# Patient Record
Sex: FEMALE | Race: BLACK OR AFRICAN AMERICAN | Employment: FULL TIME | URBAN - METROPOLITAN AREA
[De-identification: names, ages, dates, MRNs, and addresses within clinical notes are randomized per-mention and may not be internally consistent; named-entity substitution may affect disease eponyms.]

---

## 2019-08-19 PROBLEM — O99.211 OBESITY AFFECTING PREGNANCY IN FIRST TRIMESTER: Status: ACTIVE | Noted: 2019-08-19

## 2019-08-19 PROBLEM — O15.9 ECLAMPSIA: Status: ACTIVE | Noted: 2019-08-19

## 2019-08-19 PROBLEM — Z87.59 HISTORY OF PRETERM PREMATURE RUPTURE OF MEMBRANES (PPROM): Status: ACTIVE | Noted: 2019-08-19

## 2019-08-19 LAB
CHLAMYDIA, EXTERNAL: NEGATIVE
HBSAG, EXTERNAL: NEGATIVE
HIV, EXTERNAL: NEGATIVE
N. GONORRHEA, EXTERNAL: NEGATIVE
RPR, EXTERNAL: NORMAL
RUBELLA, EXTERNAL: NORMAL
TYPE, ABO & RH, EXTERNAL: NORMAL

## 2019-09-20 PROBLEM — R73.09 ABNORMAL GLUCOSE TOLERANCE TEST: Status: ACTIVE | Noted: 2019-09-20

## 2020-02-17 ENCOUNTER — HOSPITAL ENCOUNTER (OUTPATIENT)
Age: 30
Discharge: HOME | End: 2020-02-17
Attending: ANESTHESIOLOGY | Admitting: ANESTHESIOLOGY

## 2020-02-17 VITALS — BODY MASS INDEX: 48.03 KG/M2 | HEIGHT: 62 IN | WEIGHT: 261 LBS

## 2020-02-17 NOTE — CONSULTS
Patient was seen today for pre labor evaluation. She has not history of anesthesia complications. This is her third pregnancy. Airway 2 today/BMI 48. No contraindications for delivery at [facility].

## 2020-02-18 PROBLEM — Z98.890 HISTORY OF BILATERAL BREAST REDUCTION SURGERY: Chronic | Status: ACTIVE | Noted: 2020-02-18

## 2020-02-28 LAB — GRBS, EXTERNAL: NEGATIVE

## 2020-03-11 ENCOUNTER — HOSPITAL ENCOUNTER (OUTPATIENT)
Age: 30
Discharge: HOME | End: 2020-03-11
Attending: OBSTETRICS & GYNECOLOGY | Admitting: OBSTETRICS & GYNECOLOGY
Payer: COMMERCIAL

## 2020-03-11 VITALS
HEART RATE: 101 BPM | WEIGHT: 263 LBS | BODY MASS INDEX: 49.65 KG/M2 | SYSTOLIC BLOOD PRESSURE: 139 MMHG | DIASTOLIC BLOOD PRESSURE: 64 MMHG | HEIGHT: 61 IN

## 2020-03-11 PROBLEM — R10.9 ABDOMINAL CRAMPING AFFECTING PREGNANCY: Status: ACTIVE | Noted: 2020-03-11

## 2020-03-11 PROBLEM — O26.899 ABDOMINAL CRAMPING AFFECTING PREGNANCY: Status: ACTIVE | Noted: 2020-03-11

## 2020-03-11 LAB
APPEARANCE UR: ABNORMAL
BILIRUB UR QL: NEGATIVE
COLOR UR: YELLOW
GLUCOSE UR QL STRIP.AUTO: NEGATIVE MG/DL
KETONES UR-MCNC: NEGATIVE MG/DL
LEUKOCYTE ESTERASE UR QL STRIP: ABNORMAL
NITRITE UR QL: NEGATIVE
PH UR: 6.5 [PH] (ref 5–9)
PROT UR QL: 30 MG/DL
RBC # UR STRIP: ABNORMAL /UL
SERVICE CMNT-IMP: ABNORMAL
SP GR UR: 1.02 (ref 1–1.02)
UROBILINOGEN UR QL: 0.2 EU/DL (ref 0.2–1)

## 2020-03-11 PROCEDURE — 59025 FETAL NON-STRESS TEST: CPT

## 2020-03-11 PROCEDURE — 59020 FETAL CONTRACT STRESS TEST: CPT

## 2020-03-11 PROCEDURE — 81003 URINALYSIS AUTO W/O SCOPE: CPT

## 2020-03-11 PROCEDURE — 99282 EMERGENCY DEPT VISIT SF MDM: CPT

## 2020-03-11 PROCEDURE — 96360 HYDRATION IV INFUSION INIT: CPT

## 2020-03-11 PROCEDURE — 74011250636 HC RX REV CODE- 250/636: Performed by: ADVANCED PRACTICE MIDWIFE

## 2020-03-11 RX ADMIN — SODIUM CHLORIDE, SODIUM LACTATE, POTASSIUM CHLORIDE, AND CALCIUM CHLORIDE 1000 ML: 600; 310; 30; 20 INJECTION, SOLUTION INTRAVENOUS at 19:07

## 2020-03-11 NOTE — PROGRESS NOTES
1315  37+6 weeks gestation with c/o pressure and cramping x2 days. Requested pt to leave urine sample, unable at this time. 1340 SVE 1.5/50/-3 by this nurse    1404 reactive NST noted and reviewed with DEMETRICE Aldridge RN. Pt taken off FHM and encouraged to ambulate or sit on birthing ball. 1410 Pt able to leave urine sample at this time. [id number] 3/50/-3 by this nurse. Informed MONSTER Hendricks CNM, order to recheck in 2 hours. 200 3-4/50/-3 by this nurse. 1758 pt placed back on FHM. 1910 Bedside and Verbal shift change report given to DEMETRICE Ko RN (oncoming nurse) by Jimmy Jay RN   (offgoing nurse). Report included the following information SBAR, Kardex, Intake/Output, MAR, Recent Results and Med Rec Status.

## 2020-03-12 NOTE — PROGRESS NOTES
1910:  Assumed care of pt from [place], [place].  Pt in here for r/o with pain rating 3-10. Spoke with Randy Gonsalez CNM  Order for pt to finish fluids then she may go home. 2030:  SVE 4/80/-2 2038: Pt educated on pregnancy precautions. Pt expressed understanding. Pt ambulated off unit with family.

## 2020-03-12 NOTE — DISCHARGE INSTRUCTIONS
Patient Education   Patient Education        Week 37 of Your Pregnancy: Care Instructions  Your Care Instructions    You are near the end of your pregnancy--and you're probably pretty uncomfortable. It may be harder to walk around. Lying down probably isn't comfortable either. You may have trouble getting to sleep or staying asleep. Most women deliver their babies between 40 and 41 weeks. This is a good time to think about packing a bag for the hospital with items you'll need. Then you'll be ready when labor starts. Follow-up care is a key part of your treatment and safety. Be sure to make and go to all appointments, and call your doctor if you are having problems. It's also a good idea to know your test results and keep a list of the medicines you take. How can you care for yourself at home? Learn about breastfeeding  · Breastfeeding is best for your baby and good for you. · Breast milk has antibodies to help your baby fight infections. · Mothers who breastfeed often lose weight faster, because making milk burns calories. · Learning the best ways to hold your baby will make breastfeeding easier. · Let your partner bathe and diaper the baby to keep your partner from feeling left out. Snuggle together when you breastfeed. · You may want to learn how to use a breast pump and store your milk. · If you choose to bottle feed, make the feeding feel like breastfeeding so you can bond with your baby. Always hold your baby and the bottle. Do not prop bottles or let your baby fall asleep with a bottle. Learn about crying  · It is common for babies to cry for 1 to 3 hours a day. Some cry more, some cry less. · Babies don't cry to make you upset or because you are a bad parent. · Crying is how your baby communicates. Your baby may be hungry; have gas; need a diaper change; or feel cold, warm, tired, lonely, or tense. Sometimes babies cry for unknown reasons.   · If you respond to your baby's needs, he or she will learn to trust you. · Try to stay calm when your baby cries. Your baby may get more upset if he or she senses that you are upset. Know how to care for your   · Your baby's umbilical cord stump will drop off on its own, usually between 1 and 2 weeks. To care for your baby's umbilical cord area:  ? Clean the area at the bottom of the cord 2 or 3 times a day. ? Pay special attention to the area where the cord attaches to the skin. ? Keep the diaper folded below the cord. ? Use a damp washcloth or cotton ball to sponge bathe your baby until the stump has come off. · Your baby's first dark stool is called meconium. After the meconium is passed, your baby will develop his or her own bowel pattern. ? Some babies, especially  babies, have several bowel movements a day. Others have one or two a day, or one every 2 to 3 days. ?  babies often have loose, yellow stools. Formula-fed babies have more formed stools. ? If your baby's stools look like little pellets, he or she is constipated. After 2 days of constipation, call your baby's doctor. · If your baby will be circumcised, you can care for him at home. ? Gently rinse his penis with warm water after every diaper change. Do not try to remove the film that forms on the penis. This film will go away on its own. Pat dry. ? Put petroleum ointment, such as Vaseline, on the area of the diaper that will touch your baby's penis. This will keep the diaper from sticking to your baby. ? Ask the doctor about giving your baby acetaminophen (Tylenol) for pain. Where can you learn more? Go to http://sebas-florentin.info/. Enter 68 21 [id number] in the search box to learn more about \"Week 37 of Your Pregnancy: Care Instructions. \"  Current as of: May 29, 2019  Content Version: 12.2  © 6107-3396 [employer]. Care instructions adapted under license by [employer] (which disclaims liability or warranty for this information).  If you have questions about a medical condition or this instruction, always ask your healthcare professional. [address] any warranty or liability for your use of this information. Pregnancy Precautions: Care Instructions  Your Care Instructions    There is no sure way to prevent labor before your due date ( labor) or to prevent most other pregnancy problems. But there are things you can do to increase your chances of a healthy pregnancy. Go to your appointments, follow your doctor's advice, and take good care of yourself. Eat well, and exercise (if your doctor agrees). And make sure to drink plenty of water. Follow-up care is a key part of your treatment and safety. Be sure to make and go to all appointments, and call your doctor if you are having problems. It's also a good idea to know your test results and keep a list of the medicines you take. How can you care for yourself at home? · Make sure you go to your prenatal appointments. At each visit, your doctor will check your blood pressure. Your doctor will also check to see if you have protein in your urine. High blood pressure and protein in urine are signs of preeclampsia. This condition can be dangerous for you and your baby. · Drink plenty of fluids, enough so that your urine is light yellow or clear like water. Dehydration can cause contractions. If you have kidney, heart, or liver disease and have to limit fluids, talk with your doctor before you increase the amount of fluids you drink. · Tell your doctor right away if you notice any symptoms of an infection, such as:  ? Burning when you urinate. ? A foul-smelling discharge from your vagina. ? Vaginal itching. ? Unexplained fever. ? Unusual pain or soreness in your uterus or lower belly. · Eat a balanced diet. Include plenty of foods that are high in calcium and iron. ? Foods high in calcium include milk, cheese, yogurt, almonds, and broccoli. ?  Foods high in iron include red meat, shellfish, poultry, eggs, beans, raisins, whole-grain bread, and leafy green vegetables. · Do not smoke. If you need help quitting, talk to your doctor about stop-smoking programs and medicines. These can increase your chances of quitting for good. · Do not drink alcohol or use illegal drugs. · Follow your doctor's directions about activity. Your doctor will let you know how much, if any, exercise you can do. · Ask your doctor if you can have sex. If you are at risk for early labor, your doctor may ask you to not have sex. · Take care to prevent falls. During pregnancy, your joints are loose, and your balance is off. Sports such as bicycling, skiing, or in-line skating can increase your risk of falling. And don't ride horses or motorcycles, dive, water ski, scuba dive, or parachute jump while you are pregnant. · Avoid getting very hot. Do not use saunas or hot tubs. Avoid staying out in the sun in hot weather for long periods. Take acetaminophen (Tylenol) to lower a high fever. · Do not take any over-the-counter or herbal medicines or supplements without talking to your doctor or pharmacist first.  When should you call for help? Call 911 anytime you think you may need emergency care. For example, call if:    · You passed out (lost consciousness).     · You have a seizure.     · You have severe vaginal bleeding.     · You have severe pain in your belly or pelvis.     · You have had fluid gushing or leaking from your vagina and you know or think the umbilical cord is bulging into your vagina. If this happens, immediately get down on your knees so your rear end (buttocks) is higher than your head. This will decrease the pressure on the cord until help arrives.   [facility] your doctor now or seek immediate medical care if:    · You have signs of preeclampsia, such as:  ? Sudden swelling of your face, hands, or feet. ? New vision problems (such as dimness, blurring, or seeing spots). ?  A severe headache.     · You have any vaginal bleeding.     · You have belly pain or cramping.     · You have a fever.     · You have had regular contractions (with or without pain) for an hour. This means that you have 8 or more within 1 hour or 4 or more in 20 minutes after you change your position and drink fluids.     · You have a sudden release of fluid from your vagina.     · You have low back pain or pelvic pressure that does not go away.     · You notice that your baby has stopped moving or is moving much less than normal.    Watch closely for changes in your health, and be sure to contact your doctor if you have any problems. Where can you learn more? Go to http://sebas-florentin.info/. Enter [id number] in the search box to learn more about \"Pregnancy Precautions: Care Instructions. \"  Current as of: May 29, 2019  Content Version: 12.2  © 8187-9548 [employer]. Care instructions adapted under license by [employer] (which disclaims liability or warranty for this information). If you have questions about a medical condition or this instruction, always ask your healthcare professional. [address] any warranty or liability for your use of this information.

## 2020-03-14 ENCOUNTER — ANESTHESIA EVENT (OUTPATIENT)
Dept: LABOR AND DELIVERY | Age: 30
End: 2020-03-14
Payer: COMMERCIAL

## 2020-03-14 ENCOUNTER — HOSPITAL ENCOUNTER (INPATIENT)
Age: 30
LOS: 2 days | Discharge: HOME | End: 2020-03-16
Attending: OBSTETRICS & GYNECOLOGY | Admitting: OBSTETRICS & GYNECOLOGY
Payer: COMMERCIAL

## 2020-03-14 ENCOUNTER — ANESTHESIA (OUTPATIENT)
Dept: LABOR AND DELIVERY | Age: 30
End: 2020-03-14
Payer: COMMERCIAL

## 2020-03-14 PROBLEM — Z3A.38 38 WEEKS GESTATION OF PREGNANCY: Status: ACTIVE | Noted: 2020-03-14

## 2020-03-14 PROBLEM — Z33.1 IUP (INTRAUTERINE PREGNANCY), INCIDENTAL: Status: ACTIVE | Noted: 2020-03-14

## 2020-03-14 PROBLEM — O47.9 UTERINE CONTRACTIONS DURING PREGNANCY: Status: ACTIVE | Noted: 2020-03-14

## 2020-03-14 LAB
ABO + RH BLD: NORMAL
ALBUMIN SERPL-MCNC: 3.1 G/DL (ref 3.4–5)
ALBUMIN/GLOB SERPL: 0.8 {RATIO} (ref 0.8–1.7)
ALP SERPL-CCNC: 149 U/L (ref 45–117)
ALT SERPL-CCNC: 15 U/L (ref 13–56)
ANION GAP SERPL CALC-SCNC: 7 MMOL/L (ref 3–18)
AST SERPL-CCNC: 15 U/L (ref 10–38)
BASOPHILS # BLD: 0 K/UL (ref 0–0.1)
BASOPHILS NFR BLD: 0 % (ref 0–2)
BILIRUB SERPL-MCNC: 0.5 MG/DL (ref 0.2–1)
BLOOD GROUP ANTIBODIES SERPL: NORMAL
BUN SERPL-MCNC: 3 MG/DL (ref 7–18)
BUN/CREAT SERPL: 5 (ref 12–20)
CALCIUM SERPL-MCNC: 8.8 MG/DL (ref 8.5–10.1)
CHLORIDE SERPL-SCNC: 106 MMOL/L (ref 100–111)
CO2 SERPL-SCNC: 23 MMOL/L (ref 21–32)
CREAT SERPL-MCNC: 0.64 MG/DL (ref 0.6–1.3)
DIFFERENTIAL METHOD BLD: ABNORMAL
EOSINOPHIL # BLD: 0.1 K/UL (ref 0–0.4)
EOSINOPHIL NFR BLD: 1 % (ref 0–5)
ERYTHROCYTE [DISTWIDTH] IN BLOOD BY AUTOMATED COUNT: 13.8 % (ref 11.6–14.5)
GLOBULIN SER CALC-MCNC: 4 G/DL (ref 2–4)
GLUCOSE SERPL-MCNC: 99 MG/DL (ref 74–99)
HCT VFR BLD AUTO: 38.2 % (ref 35–45)
HGB BLD-MCNC: 12.1 G/DL (ref 12–16)
LYMPHOCYTES # BLD: 2.4 K/UL (ref 0.9–3.6)
LYMPHOCYTES NFR BLD: 22 % (ref 21–52)
MCH RBC QN AUTO: 29.8 PG (ref 24–34)
MCHC RBC AUTO-ENTMCNC: 31.7 G/DL (ref 31–37)
MCV RBC AUTO: 94.1 FL (ref 74–97)
MONOCYTES # BLD: 1.1 K/UL (ref 0.05–1.2)
MONOCYTES NFR BLD: 10 % (ref 3–10)
NEUTS SEG # BLD: 7.3 K/UL (ref 1.8–8)
NEUTS SEG NFR BLD: 67 % (ref 40–73)
PLATELET # BLD AUTO: 181 K/UL (ref 135–420)
PMV BLD AUTO: 13.4 FL (ref 9.2–11.8)
POTASSIUM SERPL-SCNC: 3.6 MMOL/L (ref 3.5–5.5)
PROT SERPL-MCNC: 7.1 G/DL (ref 6.4–8.2)
RBC # BLD AUTO: 4.06 M/UL (ref 4.2–5.3)
SODIUM SERPL-SCNC: 136 MMOL/L (ref 136–145)
SPECIMEN EXP DATE BLD: NORMAL
URATE SERPL-MCNC: 5.8 MG/DL (ref 2.6–7.2)
WBC # BLD AUTO: 10.9 K/UL (ref 4.6–13.2)

## 2020-03-14 PROCEDURE — 74011250636 HC RX REV CODE- 250/636: Performed by: SPECIALIST

## 2020-03-14 PROCEDURE — 75410000000 HC DELIVERY VAGINAL/SINGLE

## 2020-03-14 PROCEDURE — 75410000002 HC LABOR FEE PER 1 HR

## 2020-03-14 PROCEDURE — 75410000003 HC RECOV DEL/VAG/CSECN EA 0.5 HR

## 2020-03-14 PROCEDURE — 77030007879 HC KT SPN EPDRL TELE -B: Performed by: SPECIALIST

## 2020-03-14 PROCEDURE — 80053 COMPREHEN METABOLIC PANEL: CPT

## 2020-03-14 PROCEDURE — 83615 LACTATE (LD) (LDH) ENZYME: CPT

## 2020-03-14 PROCEDURE — 76060000078 HC EPIDURAL ANESTHESIA

## 2020-03-14 PROCEDURE — 74011250636 HC RX REV CODE- 250/636: Performed by: ADVANCED PRACTICE MIDWIFE

## 2020-03-14 PROCEDURE — 86900 BLOOD TYPING SEROLOGIC ABO: CPT

## 2020-03-14 PROCEDURE — 65270000029 HC RM PRIVATE

## 2020-03-14 PROCEDURE — 74011250636 HC RX REV CODE- 250/636

## 2020-03-14 PROCEDURE — 84550 ASSAY OF BLOOD/URIC ACID: CPT

## 2020-03-14 PROCEDURE — 74011250637 HC RX REV CODE- 250/637: Performed by: ADVANCED PRACTICE MIDWIFE

## 2020-03-14 PROCEDURE — 85025 COMPLETE CBC W/AUTO DIFF WBC: CPT

## 2020-03-14 PROCEDURE — 99281 EMR DPT VST MAYX REQ PHY/QHP: CPT

## 2020-03-14 RX ORDER — ONDANSETRON 2 MG/ML
4 INJECTION INTRAMUSCULAR; INTRAVENOUS
Status: DISCONTINUED | OUTPATIENT
Start: 2020-03-14 | End: 2020-03-14 | Stop reason: HOSPADM

## 2020-03-14 RX ORDER — ZOLPIDEM TARTRATE 5 MG/1
5 TABLET ORAL
Status: DISCONTINUED | OUTPATIENT
Start: 2020-03-14 | End: 2020-03-16 | Stop reason: HOSPADM

## 2020-03-14 RX ORDER — METHYLERGONOVINE MALEATE 0.2 MG/ML
0.2 INJECTION INTRAVENOUS AS NEEDED
Status: DISCONTINUED | OUTPATIENT
Start: 2020-03-14 | End: 2020-03-14 | Stop reason: HOSPADM

## 2020-03-14 RX ORDER — TERBUTALINE SULFATE 1 MG/ML
0.25 INJECTION SUBCUTANEOUS
Status: DISCONTINUED | OUTPATIENT
Start: 2020-03-14 | End: 2020-03-14 | Stop reason: HOSPADM

## 2020-03-14 RX ORDER — NALBUPHINE HYDROCHLORIDE 10 MG/ML
10 INJECTION, SOLUTION INTRAMUSCULAR; INTRAVENOUS; SUBCUTANEOUS
Status: DISCONTINUED | OUTPATIENT
Start: 2020-03-14 | End: 2020-03-14 | Stop reason: HOSPADM

## 2020-03-14 RX ORDER — FENTANYL CITRATE 50 UG/ML
100 INJECTION, SOLUTION INTRAMUSCULAR; INTRAVENOUS ONCE
Status: DISCONTINUED | OUTPATIENT
Start: 2020-03-14 | End: 2020-03-14 | Stop reason: HOSPADM

## 2020-03-14 RX ORDER — IBUPROFEN 400 MG/1
800 TABLET ORAL
Status: DISCONTINUED | OUTPATIENT
Start: 2020-03-14 | End: 2020-03-16 | Stop reason: HOSPADM

## 2020-03-14 RX ORDER — FENTANYL CITRATE 50 UG/ML
INJECTION, SOLUTION INTRAMUSCULAR; INTRAVENOUS AS NEEDED
Status: DISCONTINUED | OUTPATIENT
Start: 2020-03-14 | End: 2020-03-14

## 2020-03-14 RX ORDER — SODIUM CHLORIDE, SODIUM LACTATE, POTASSIUM CHLORIDE, CALCIUM CHLORIDE 600; 310; 30; 20 MG/100ML; MG/100ML; MG/100ML; MG/100ML
999 INJECTION, SOLUTION INTRAVENOUS ONCE
Status: COMPLETED | OUTPATIENT
Start: 2020-03-14 | End: 2020-03-14

## 2020-03-14 RX ORDER — AMOXICILLIN 250 MG
1 CAPSULE ORAL
Status: DISCONTINUED | OUTPATIENT
Start: 2020-03-14 | End: 2020-03-16 | Stop reason: HOSPADM

## 2020-03-14 RX ORDER — LIDOCAINE HYDROCHLORIDE 10 MG/ML
20 INJECTION, SOLUTION EPIDURAL; INFILTRATION; INTRACAUDAL; PERINEURAL AS NEEDED
Status: DISCONTINUED | OUTPATIENT
Start: 2020-03-14 | End: 2020-03-14 | Stop reason: HOSPADM

## 2020-03-14 RX ORDER — OXYTOCIN/0.9 % SODIUM CHLORIDE 20/1000 ML
999 PLASTIC BAG, INJECTION (ML) INTRAVENOUS ONCE
Status: DISCONTINUED | OUTPATIENT
Start: 2020-03-14 | End: 2020-03-14 | Stop reason: HOSPADM

## 2020-03-14 RX ORDER — FENTANYL/ROPIVACAINE/NS/PF 2MCG/ML-.1
PLASTIC BAG, INJECTION (ML) EPIDURAL
Status: DISPENSED
Start: 2020-03-14 | End: 2020-03-15

## 2020-03-14 RX ORDER — SODIUM CHLORIDE 0.9 % (FLUSH) 0.9 %
5-40 SYRINGE (ML) INJECTION EVERY 8 HOURS
Status: DISCONTINUED | OUTPATIENT
Start: 2020-03-14 | End: 2020-03-14 | Stop reason: HOSPADM

## 2020-03-14 RX ORDER — FENTANYL CITRATE 50 UG/ML
INJECTION, SOLUTION INTRAMUSCULAR; INTRAVENOUS AS NEEDED
Status: DISCONTINUED | OUTPATIENT
Start: 2020-03-14 | End: 2020-03-14 | Stop reason: HOSPADM

## 2020-03-14 RX ORDER — PROMETHAZINE HYDROCHLORIDE 25 MG/ML
25 INJECTION, SOLUTION INTRAMUSCULAR; INTRAVENOUS
Status: DISCONTINUED | OUTPATIENT
Start: 2020-03-14 | End: 2020-03-16 | Stop reason: HOSPADM

## 2020-03-14 RX ORDER — MINERAL OIL
30 OIL (ML) ORAL AS NEEDED
Status: COMPLETED | OUTPATIENT
Start: 2020-03-14 | End: 2020-03-14

## 2020-03-14 RX ORDER — ROPIVACAINE IN 0.9% SOD CHL/PF 0.2 %
PLASTIC BAG, INJECTION (ML) EPIDURAL AS NEEDED
Status: DISCONTINUED | OUTPATIENT
Start: 2020-03-14 | End: 2020-03-14 | Stop reason: HOSPADM

## 2020-03-14 RX ORDER — SODIUM CHLORIDE 0.9 % (FLUSH) 0.9 %
5-40 SYRINGE (ML) INJECTION AS NEEDED
Status: DISCONTINUED | OUTPATIENT
Start: 2020-03-14 | End: 2020-03-14 | Stop reason: HOSPADM

## 2020-03-14 RX ORDER — FENTANYL/ROPIVACAINE/NS/PF 2MCG/ML-.1
1-15 PLASTIC BAG, INJECTION (ML) EPIDURAL
Status: DISCONTINUED | OUTPATIENT
Start: 2020-03-14 | End: 2020-03-14 | Stop reason: HOSPADM

## 2020-03-14 RX ORDER — DIPHENHYDRAMINE HYDROCHLORIDE 50 MG/ML
25 INJECTION, SOLUTION INTRAMUSCULAR; INTRAVENOUS
Status: DISCONTINUED | OUTPATIENT
Start: 2020-03-14 | End: 2020-03-14 | Stop reason: HOSPADM

## 2020-03-14 RX ORDER — HYDROMORPHONE HYDROCHLORIDE 1 MG/ML
1 INJECTION, SOLUTION INTRAMUSCULAR; INTRAVENOUS; SUBCUTANEOUS
Status: DISCONTINUED | OUTPATIENT
Start: 2020-03-14 | End: 2020-03-14 | Stop reason: HOSPADM

## 2020-03-14 RX ORDER — OXYTOCIN/0.9 % SODIUM CHLORIDE 20/1000 ML
125 PLASTIC BAG, INJECTION (ML) INTRAVENOUS CONTINUOUS
Status: DISCONTINUED | OUTPATIENT
Start: 2020-03-14 | End: 2020-03-14 | Stop reason: HOSPADM

## 2020-03-14 RX ORDER — ACETAMINOPHEN 325 MG/1
650 TABLET ORAL
Status: DISCONTINUED | OUTPATIENT
Start: 2020-03-14 | End: 2020-03-16 | Stop reason: HOSPADM

## 2020-03-14 RX ORDER — BUTORPHANOL TARTRATE 2 MG/ML
2 INJECTION INTRAMUSCULAR; INTRAVENOUS
Status: DISCONTINUED | OUTPATIENT
Start: 2020-03-14 | End: 2020-03-14 | Stop reason: HOSPADM

## 2020-03-14 RX ORDER — FENTANYL CITRATE 50 UG/ML
INJECTION, SOLUTION INTRAMUSCULAR; INTRAVENOUS
Status: COMPLETED
Start: 2020-03-14 | End: 2020-03-14

## 2020-03-14 RX ORDER — OXYCODONE AND ACETAMINOPHEN 5; 325 MG/1; MG/1
2 TABLET ORAL
Status: DISCONTINUED | OUTPATIENT
Start: 2020-03-14 | End: 2020-03-16 | Stop reason: HOSPADM

## 2020-03-14 RX ORDER — SODIUM CHLORIDE, SODIUM LACTATE, POTASSIUM CHLORIDE, CALCIUM CHLORIDE 600; 310; 30; 20 MG/100ML; MG/100ML; MG/100ML; MG/100ML
125 INJECTION, SOLUTION INTRAVENOUS CONTINUOUS
Status: DISCONTINUED | OUTPATIENT
Start: 2020-03-14 | End: 2020-03-14 | Stop reason: HOSPADM

## 2020-03-14 RX ORDER — NALOXONE HYDROCHLORIDE 0.4 MG/ML
0.2 INJECTION, SOLUTION INTRAMUSCULAR; INTRAVENOUS; SUBCUTANEOUS AS NEEDED
Status: DISCONTINUED | OUTPATIENT
Start: 2020-03-14 | End: 2020-03-14 | Stop reason: HOSPADM

## 2020-03-14 RX ADMIN — FENTANYL CITRATE 100 MCG: 50 INJECTION, SOLUTION INTRAMUSCULAR; INTRAVENOUS at 15:49

## 2020-03-14 RX ADMIN — MINERAL 30 ML: 999 OIL ORAL at 15:50

## 2020-03-14 RX ADMIN — FENTANYL CITRATE 100 MCG: 50 INJECTION, SOLUTION INTRAMUSCULAR; INTRAVENOUS at 15:30

## 2020-03-14 RX ADMIN — Medication 3 ML: at 15:45

## 2020-03-14 RX ADMIN — Medication 3 ML: at 15:42

## 2020-03-14 RX ADMIN — SODIUM CHLORIDE, SODIUM LACTATE, POTASSIUM CHLORIDE, AND CALCIUM CHLORIDE 999 ML/HR: 600; 310; 30; 20 INJECTION, SOLUTION INTRAVENOUS at 16:45

## 2020-03-14 RX ADMIN — Medication 3 ML: at 15:48

## 2020-03-14 RX ADMIN — SODIUM CHLORIDE, SODIUM LACTATE, POTASSIUM CHLORIDE, AND CALCIUM CHLORIDE 125 ML/HR: 600; 310; 30; 20 INJECTION, SOLUTION INTRAVENOUS at 14:45

## 2020-03-14 RX ADMIN — Medication 999 ML/HR: at 15:58

## 2020-03-14 NOTE — PROGRESS NOTES
Received report from Dougie Newby. Assuming care of pt at this time. Pt wants an epidural- Dr Kita Yu has been paged.

## 2020-03-14 NOTE — ANESTHESIA PROCEDURE NOTES
Epidural Block    Start time: 3/14/2020 3:25 PM  End time: 3/14/2020 3:53 PM  Performed by: Enrique Mina MD  Authorized by: Enrique Mina MD     Pre-Procedure  Indication: labor epidural    Preanesthetic Checklist: patient identified, risks and benefits discussed, anesthesia consent, site marked, patient being monitored, timeout performed and anesthesia consent    Timeout Time: 15:06        Epidural:   Patient position:  Seated  Prep region:  Lumbar  Prep: Betadine and Patient draped    Location:  L3-4    Needle and Epidural Catheter:   Needle Type:  Tuohy  Needle Gauge:  17 G  Injection Technique:  Loss of resistance using saline  Attempts:  1  Catheter Size:  19 G  Catheter at Skin Depth (cm):  12  Depth in Epidural Space (cm):  5  Events: no blood with aspiration, no cerebrospinal fluid with aspiration, no paresthesia and negative aspiration test    Test Dose:  Negative    Assessment:   Catheter Secured:  Tegaderm and tape  Insertion:  Uncomplicated  Patient tolerance:  Patient tolerated the procedure well with no immediate complications  Ultrasound prescan  Dosed incrementally through epidural needle - difficulty threading conventional catheter - easily threaded less compliant catheter and negative aspirate - no infusion as patient complete and pushing at conclusion.

## 2020-03-14 NOTE — L&D DELIVERY NOTE
Delivery Note    Obstetrician:  Amie Marin CNM    Assistant: none    Pre-Delivery Diagnosis: Term pregnancy, Spontaneous labor and Single fetus    Post-Delivery Diagnosis: Living  infant(s) and Female    Intrapartum Event: Precipitous labor (less than 3 hours)    Procedure: Spontaneous vaginal delivery    Epidural: YES    Monitor:  Fetal Heart Tones - External and Uterine Contractions - External    Indications for instrumental delivery: none    Estimated Blood Loss: 350    Episiotomy: none    Laceration(s):  none    Laceration(s) repair: NO    Presentation: Cephalic    Fetal Description: kamara    Fetal Position: Occiput Anterior    Birth Weight: 7lbs 2oz    Birth Length: not yet assessed    Apgar - One Minute: 9    Apgar - Five Minutes: 9    Umbilical Cord: 3 vessels present  Specimens: placenta delivered intact: accessory lobe noted  Complications:  none           Cord Blood Results:   Information for the patient's :  Alisa Méndez [[id number]]   No results found for: PCTABR, PCTDIG, BILI, ABORH    Prenatal Labs:     Lab Results   Component Value Date/Time    ABO/Rh(D) B POSITIVE 2020 02:45 PM    HBsAg, External Negative 2019    HIV, External Negative 2019    Rubella, External Immune 2019    RPR, External Non-Reactive 2019    Gonorrhea, External Negative 2019    Chlamydia, External Negative 2019    GrBStrep, External Negative 2020        Attending Attestation: I was present and scrubbed for the entire procedure

## 2020-03-14 NOTE — PROGRESS NOTES
1920- Bedside report received from INDIA Carpenter RN & B,Ayse Hahn RN . Pt. Stable. Needs addressed. Callbell within reach. 2010- Pt. Joined at bedside by significant other and baby. AAOx4. Pain 0/10. Educated on pain management. Whiteboard updated. Educated on plan of care and signs and symptoms to report. No further questions on concerns at this time. Fundus firm at U +1, small rubra lochia. No clots noted. Assessment complete. Callbell within reach. Bed in lowest position. Provided pt. with sani-wipes, educate on infection control via hand hygiene via hand hygiene, sani-wipe and hand  and visitor protocol. Mother verbalized understanding. 2210- Rounding complete. Pt resting comfortably. No needs or concerns at this time. 0007- Pt vital signs stable. Pain rated 0/10.       0321 - Rounding complete. Needs addressed. No further concerns expressed. 0700- Bedside report received from Mike Owens RN & B. Lydia Cross RN . Pt. Stable. Needs addressed. Callbell within reach.

## 2020-03-14 NOTE — H&P
History & Physical    Name: Terri Velasquez MRN: [id number]  SSN: [id number]    YOB: 1990  Age: 30 y.o. Sex: female        Subjective:     Estimated Date of Delivery: 3/26/20  OB History    Para Term  AB Living   6 2 1 1 3 2   SAB TAB Ectopic Molar Multiple Live Births   3         2      # Outcome Date GA Lbr Pito/2nd Weight Sex Delivery Anes PTL Lv   6 Current            5 2018           4 Term  38w0d   F Vag-Spont   HUBER      Complications: Eclampsia   3 SAB            2 SAB            1   27w0d   M Vag-Spont  N HUBER      Complications: Other (comment), History of  premature rupture of membranes (PPROM)       Ms. Cris Erazo is admitted with pregnancy at 38w2d for active labor. Prenatal course was complicated by obesity, hx of eclapsia. Please see prenatal records for details. Past Medical History:   Diagnosis Date    [facility] ([facility] [address]     Gestational hypertension     Seizures ([facility] [address] 2012    x 1 two weeks after child birth was on dilantin for 6 months    Vaginal delivery     x2     Past Surgical History:   Procedure Laterality Date    BREAST SURGERY PROCEDURE UNLISTED      HX BREAST REDUCTION Bilateral 2015    BILATERAL BREAST REDUCTION W/FREE NIPPLE GRAFT performed by Kylee Peng MD at [address] [address] [facility] OTHER SURGICAL  2007    excision bartholin gland cyst     Social History     Occupational History    Not on file   Tobacco Use    Smoking status: Never Smoker    Smokeless tobacco: Never Used   Substance and Sexual Activity    Alcohol use: Yes     Alcohol/week: 2.0 standard drinks     Types: 2 Shots of liquor per week    Drug use: No    Sexual activity: Yes     Partners: Male     Birth control/protection: None     Family History   Family history unknown: Yes       No Known Allergies  Prior to Admission medications    Medication Sig Start Date End Date Taking?  Authorizing Provider   aspirin 81 mg chewable tablet Take 81 mg by mouth daily.    Provider, Historical   PNV No12-Iron-FA-DSS-OM-3 29 mg iron-1 mg -50 mg CPKD Take  by mouth. Provider, Historical        Review of Systems: A comprehensive review of systems was negative except for that written in the HPI. Objective:     Vitals: There were no vitals filed for this visit. Physical Exam:  Cervical Exam: 6 cm dilated    100% effaced    -1 station    Presenting Part: cephalic  Cervical Position: anterior  Membranes:  Intact  Fetal Heart Rate: Baseline: 110 per minute  Variability: moderate  Accelerations: yes  Decelerations: variable and early  Uterine contractions: regular, every 3-4 minutes    Prenatal Labs:   Lab Results   Component Value Date/Time    Rubella, External Immune 08/19/2019    GrBStrep, External Negative 02/28/2020    HBsAg, External Negative 08/19/2019    HIV, External Negative 08/19/2019    RPR, External Non-Reactive 08/19/2019    Gonorrhea, External Negative 08/19/2019    Chlamydia, External Negative 08/19/2019    ABO,Rh B Positive 08/19/2019         Assessment/Plan:     Active Problems:    IUP (intrauterine pregnancy), incidental (3/14/2020)      38 weeks gestation of pregnancy (3/14/2020)      Uterine contractions during pregnancy (3/14/2020)         Plan: Admit for Reassuring fetal status, Labor  Progressing normally, Continue plan for vaginal delivery. Group B Strep was negative.

## 2020-03-14 NOTE — ANESTHESIA PREPROCEDURE EVALUATION
Relevant Problems   No relevant active problems       Anesthetic History   No history of anesthetic complications            Review of Systems / Medical History  Patient summary reviewed, nursing notes reviewed and pertinent labs reviewed    Pulmonary  Within defined limits                 Neuro/Psych     seizures (eclampsia - yars ago - no recurrence)         Cardiovascular                Pertinent negatives: No hypertension  Exercise tolerance: >4 METS     GI/Hepatic/Renal  Within defined limits              Endo/Other        Morbid obesity     Other Findings              Physical Exam    Airway  Mallampati: II  TM Distance: 4 - 6 cm  Neck ROM: normal range of motion   Mouth opening: Normal     Cardiovascular               Dental  No notable dental hx       Pulmonary                 Abdominal         Other Findings            Anesthetic Plan    ASA: 3  Anesthesia type: epidural      Post-op pain plan if not by surgeon: indwelling epidural catheter      Anesthetic plan and risks discussed with: Patient and Family      Risks/benefits explained: infection, nerve injury, bleeding, headache, back pain, failed epidural - she wishes to proceed.

## 2020-03-14 NOTE — PROGRESS NOTES
Aniyah Bunn CNM aware of pt's low BP of 70's/ 30's- order received to run bag of IV with  pitocin at bolus with a pressure bag, then hang 1 additional liter of LR at bolus rate.

## 2020-03-14 NOTE — PROGRESS NOTES
OOB with minimal assist to BR to void. Voided 475 ml urine, was instructed in and asisted in performing sky care. dermoplast soray applied to perineum. Washed up. Fresh gown, fresh sky pad, fresh cold pack placed. Ambulated from BR to wheelchair. No weakness or dizziness noted.

## 2020-03-14 NOTE — PROGRESS NOTES
Dr Livia Gregory made aware of pt's low BP of 97/47, pt asymptomatic- he says to bolus IV fluids- informed already being done.

## 2020-03-14 NOTE — PROGRESS NOTES
1423  at 38.2 weeks arrives to unit with complaints of contractions. Pt taken to labor room 3 for assessment. 56 SANDRA Wild CNM at bedside. SVE: /-1. Admission orders received. 1445 Pt oriented to room and call bell. 1515 Bedside and verbal report given to NOMI Turner RN.

## 2020-03-15 LAB
HCT VFR BLD AUTO: 28.3 % (ref 35–45)
HGB BLD-MCNC: 9 G/DL (ref 12–16)
LDH SERPL L TO P-CCNC: 174 U/L (ref 81–234)

## 2020-03-15 PROCEDURE — 85018 HEMOGLOBIN: CPT

## 2020-03-15 PROCEDURE — 85014 HEMATOCRIT: CPT

## 2020-03-15 PROCEDURE — 74011250637 HC RX REV CODE- 250/637: Performed by: ADVANCED PRACTICE MIDWIFE

## 2020-03-15 PROCEDURE — 65270000029 HC RM PRIVATE

## 2020-03-15 PROCEDURE — 36415 COLL VENOUS BLD VENIPUNCTURE: CPT

## 2020-03-15 RX ADMIN — IBUPROFEN 800 MG: 400 TABLET, FILM COATED ORAL at 01:22

## 2020-03-15 RX ADMIN — IBUPROFEN 800 MG: 400 TABLET, FILM COATED ORAL at 09:21

## 2020-03-15 RX ADMIN — IBUPROFEN 800 MG: 400 TABLET, FILM COATED ORAL at 17:46

## 2020-03-15 NOTE — PROGRESS NOTES
Problem: Patient Education: Go to Patient Education Activity  Goal: Patient/Family Education  Outcome: Progressing Towards Goal     Problem: Pain  Goal: *Control of Pain  Outcome: Progressing Towards Goal     Problem: Patient Education: Go to Patient Education Activity  Goal: Patient/Family Education  Outcome: Progressing Towards Goal     Problem: Vaginal Delivery: Postpartum Day 1  Goal: Activity/Safety  Outcome: Progressing Towards Goal  Goal: Consults, if ordered  Outcome: Progressing Towards Goal  Goal: Diagnostic Test/Procedures  Outcome: Progressing Towards Goal  Goal: Nutrition/Diet  Outcome: Progressing Towards Goal  Goal: Discharge Planning  Outcome: Progressing Towards Goal  Goal: Medications  Outcome: Progressing Towards Goal  Goal: Treatments/Interventions/Procedures  Outcome: Progressing Towards Goal  Goal: Psychosocial  Outcome: Progressing Towards Goal  Goal: *Vital signs within defined limits  Outcome: Progressing Towards Goal  Goal: *Labs within defined limits  Outcome: Progressing Towards Goal  Goal: *Hemodynamically stable  Outcome: Progressing Towards Goal  Goal: *Optimal pain control at patient's stated goal  Outcome: Progressing Towards Goal  Goal: *Performs self breast care  Outcome: Progressing Towards Goal

## 2020-03-15 NOTE — PROGRESS NOTES
1345 - Report received from CARISSA Painting for mom and baby. [address] on mom. Mom in bathroom  Will come back. 18 - Checked on mom and baby. Pt rates pain at a 4, but declines any further interventions at this time. Crackers, peanut butter, and soda brought on request.  No other needs at this time. 1525 - PM assessment done on mom and baby. Mom asked if she could have a pacifier for baby, so I got one from the nursery for her to use. Pt asked if she 'has' to continue to use the ice peripads. I informed her that she does not, that they are for her comfort, and that if she prefers, she can switch to the regular sky pads. No other needs at this time. 1755 - Pain assessment done on mom. Mom given pain medication, per MAR. IV removed, protocol. Baby asleep on mom's chest.  Baby was fussy; baby swaddled, given pacifier, and put to sleep supine in bassinet. Mom asked about putting baby to sleep on stomach. This RN educated mom on safe sleeping practices:  Sleeping supine; on a hard, flat surface with no loose blankets, stuffed animals, or other items in the bassinet. 1905 - Pain reassessment done. No other needs at this time. 1908 - Bedside and Verbal shift change report given to LOUIS Gutierrez (oncoming nurse) by [employer]. CLAIR Carpenter (offgoing nurse). Report included the following information SBAR, Procedure Summary, Intake/Output and MAR.

## 2020-03-15 NOTE — PROGRESS NOTES
1908- Bedside report received from El Simmons RN & [place]. Stella Guevara RN . Pt. Stable. Needs addressed. Callbell within reach. 2132- Pt. Joined at bedside by significant other and baby. AAOx4. Pain 0/10. Educated on pain management. Whiteboard updated. Educated on plan of care and signs and symptoms to report, discussed preparation for discharge. No further questions on concerns at this time. Fundus firm at U , small rubra lochia. No clots noted. Assessment complete. Callbell within reach. Bed in lowest position. 2347- Rounding complete. Pt pain rated 0/10. Needs and concerns addressed. 0245- Rounding complete. Pt resting comfortably. 0430- Rounding complete. Needs addressed. No further concerns expressed. 0720- Bedside and Verbal shift change report given to TOMASZ Felder RN  (oncoming nurse) by LOUIS Terrell (offgoing nurse). Report included the following information SBAR, Kardex, Intake/Output, MAR and Recent Results.

## 2020-03-15 NOTE — PROGRESS NOTES
Problem: Patient Education: Go to Patient Education Activity  Goal: Patient/Family Education  3/14/2020 2038 by Shavonne Kwan RN  Outcome: Progressing Towards Goal  3/14/2020 2037 by Shavonne Kwan RN  Outcome: Progressing Towards Goal     Problem: Pain  Goal: *Control of Pain  Outcome: Progressing Towards Goal     Problem: Patient Education: Go to Patient Education Activity  Goal: Patient/Family Education  Outcome: Progressing Towards Goal     Problem: Vaginal Delivery: Postpartum Day 1  Goal: Activity/Safety  Outcome: Progressing Towards Goal  Goal: Diagnostic Test/Procedures  Outcome: Progressing Towards Goal  Goal: Nutrition/Diet  Outcome: Progressing Towards Goal  Goal: Medications  Outcome: Progressing Towards Goal  Goal: Treatments/Interventions/Procedures  Outcome: Progressing Towards Goal  Goal: Psychosocial  Outcome: Progressing Towards Goal  Goal: *Vital signs within defined limits  Outcome: Progressing Towards Goal  Goal: *Labs within defined limits  Outcome: Progressing Towards Goal  Goal: *Hemodynamically stable  Outcome: Progressing Towards Goal  Goal: *Optimal pain control at patient's stated goal  Outcome: Progressing Towards Goal

## 2020-03-15 NOTE — PROGRESS NOTES
Progress Note    Patient: Mackenzie Maharaj MRN: [id number]     YOB: 1990  Age: 30 y.o. Subjective:     Postpartum Day: 1    The patient is feeling well. Pain is  well controlled with current medications. Urinary output is adequate. Baby is feeding via breast without difficulty. Objective:      Patient Vitals for the past 12 hrs:   Temp Pulse Resp BP SpO2   03/15/20 0853 98 °F (36.7 °C) 77 17 122/71 99 %   03/15/20 0007 99 °F (37.2 °C) 100 18 118/56 98 %   03/14/20 2319 99.7 °F (37.6 °C) (!) 104 20 117/66 99 %       General:    alert, cooperative, no distress   Lochia:  appropriate   Uterine Fundus:   firm @ umbilicus    Perineum:  well-approximated   DVT Evaluation:  No evidence of DVT seen on physical exam.  Negative Rosa's sign. Lab/Data Review:  Recent Results (from the past 24 hour(s))   CBC WITH AUTOMATED DIFF    Collection Time: 03/14/20  2:45 PM   Result Value Ref Range    WBC 10.9 4.6 - 13.2 K/uL    RBC 4.06 (L) 4.20 - 5.30 M/uL    HGB 12.1 12.0 - 16.0 g/dL    HCT 38.2 35.0 - 45.0 %    MCV 94.1 74.0 - 97.0 FL    MCH 29.8 24.0 - 34.0 PG    MCHC 31.7 31.0 - 37.0 g/dL    RDW 13.8 11.6 - 14.5 %    PLATELET 494 342 - 737 K/uL    MPV 13.4 (H) 9.2 - 11.8 FL    NEUTROPHILS 67 40 - 73 %    LYMPHOCYTES 22 21 - 52 %    MONOCYTES 10 3 - 10 %    EOSINOPHILS 1 0 - 5 %    BASOPHILS 0 0 - 2 %    ABS. NEUTROPHILS 7.3 1.8 - 8.0 K/UL    ABS. LYMPHOCYTES 2.4 0.9 - 3.6 K/UL    ABS. MONOCYTES 1.1 0.05 - 1.2 K/UL    ABS. EOSINOPHILS 0.1 0.0 - 0.4 K/UL    ABS.  BASOPHILS 0.0 0.0 - 0.1 K/UL    DF AUTOMATED     TYPE & SCREEN    Collection Time: 03/14/20  2:45 PM   Result Value Ref Range    Crossmatch Expiration 03/17/2020     ABO/Rh(D) B POSITIVE     Antibody screen NEG    METABOLIC PANEL, COMPREHENSIVE    Collection Time: 03/14/20  2:45 PM   Result Value Ref Range    Sodium 136 136 - 145 mmol/L    Potassium 3.6 3.5 - 5.5 mmol/L    Chloride 106 100 - 111 mmol/L    CO2 23 21 - 32 mmol/L    Anion gap 7 3.0 - 18 mmol/L    Glucose 99 74 - 99 mg/dL    BUN 3 (L) 7.0 - 18 MG/DL    Creatinine 0.64 0.6 - 1.3 MG/DL    BUN/Creatinine ratio 5 (L) 12 - 20      GFR est AA >60 >60 ml/min/1.73m2    GFR est non-AA >60 >60 ml/min/1.73m2    Calcium 8.8 8.5 - 10.1 MG/DL    Bilirubin, total 0.5 0.2 - 1.0 MG/DL    ALT (SGPT) 15 13 - 56 U/L    AST (SGOT) 15 10 - 38 U/L    Alk. phosphatase 149 (H) 45 - 117 U/L    Protein, total 7.1 6.4 - 8.2 g/dL    Albumin 3.1 (L) 3.4 - 5.0 g/dL    Globulin 4.0 2.0 - 4.0 g/dL    A-G Ratio 0.8 0.8 - 1.7     URIC ACID    Collection Time: 03/14/20  2:45 PM   Result Value Ref Range    Uric acid 5.8 2.6 - 7.2 MG/DL   HEMOGLOBIN    Collection Time: 03/15/20  3:55 AM   Result Value Ref Range    HGB 9.0 (L) 12.0 - 16.0 g/dL   HEMATOCRIT    Collection Time: 03/15/20  3:55 AM   Result Value Ref Range    HCT 28.3 (L) 35.0 - 45.0 %     All lab results for the last 24 hours reviewed. Assessment:     Delivery: spontaneous vaginal delivery    Plan:     Doing well postpartum vaginal delivery. Continue current postpartum care. Encouraged hydration, nutrition and ambulation. Plan DC home tomorrow. F/U with Dr. Ravi Ashford as directed.      Signed By: Guillermo Ventura CNM     March 15, 2020

## 2020-03-15 NOTE — ANESTHESIA POSTPROCEDURE EVALUATION
3/15/2020  4:39 PM    Laboring Epidural Follow-up Note     Referring physician: Cheri Moore,*   Patient status post vaginal delivery with labor epidural    Visit Vitals  /80 (BP 1 Location: Right arm, BP Patient Position: At rest)   Pulse 72   Temp 36.9 °C (98.5 °F)   Resp 17   LMP 06/20/2019 (Exact Date)   SpO2 98%   Breastfeeding Unknown       Epidural removed by L&D staff  No sedation, pruritis noted.   Adequate analgesia but not much time before delivery  No obvious anesthesia complications          Yue Patel, CRNA

## 2020-03-15 NOTE — PROGRESS NOTES
0710: Report received from Alaina Huggins RN. Bedside rounds complete, patient and guest resting with eyes closed. Baby is swaddled in bassinet with visible chest rise and fall. Will come back for assessments. 1245: Report given to [facility] for care.

## 2020-03-16 VITALS
DIASTOLIC BLOOD PRESSURE: 75 MMHG | RESPIRATION RATE: 16 BRPM | OXYGEN SATURATION: 100 % | HEART RATE: 83 BPM | TEMPERATURE: 97.9 F | SYSTOLIC BLOOD PRESSURE: 116 MMHG

## 2020-03-16 PROBLEM — Z33.1 IUP (INTRAUTERINE PREGNANCY), INCIDENTAL: Status: RESOLVED | Noted: 2020-03-14 | Resolved: 2020-03-16

## 2020-03-16 PROBLEM — R10.9 ABDOMINAL CRAMPING AFFECTING PREGNANCY: Status: RESOLVED | Noted: 2020-03-11 | Resolved: 2020-03-16

## 2020-03-16 PROBLEM — O26.899 ABDOMINAL CRAMPING AFFECTING PREGNANCY: Status: RESOLVED | Noted: 2020-03-11 | Resolved: 2020-03-16

## 2020-03-16 PROBLEM — O99.211 OBESITY AFFECTING PREGNANCY IN FIRST TRIMESTER: Status: RESOLVED | Noted: 2019-08-19 | Resolved: 2020-03-16

## 2020-03-16 PROBLEM — Z3A.38 38 WEEKS GESTATION OF PREGNANCY: Status: RESOLVED | Noted: 2020-03-14 | Resolved: 2020-03-16

## 2020-03-16 PROBLEM — Z87.59 HISTORY OF ECLAMPSIA: Status: ACTIVE | Noted: 2019-08-19

## 2020-03-16 PROBLEM — O47.9 UTERINE CONTRACTIONS DURING PREGNANCY: Status: RESOLVED | Noted: 2020-03-14 | Resolved: 2020-03-16

## 2020-03-16 PROBLEM — D62 ANEMIA ASSOCIATED WITH ACUTE BLOOD LOSS: Status: ACTIVE | Noted: 2020-03-16

## 2020-03-16 PROBLEM — R73.09 ABNORMAL GLUCOSE TOLERANCE TEST: Status: RESOLVED | Noted: 2019-09-20 | Resolved: 2020-03-16

## 2020-03-16 NOTE — PROGRESS NOTES
Post-Partum Day Number 2 Progress Note    Volodymyr Coleen     Assessment:   Hospital Problems  Date Reviewed: 3/16/2020          Codes Class Noted POA    Spontaneous vaginal delivery ICD-10-CM: O80  ICD-9-CM: 991  3/16/2020 No        Anemia associated with acute blood loss ICD-10-CM: D62  ICD-9-CM: 285.1  3/16/2020 No        History of eclampsia ICD-10-CM: Z87.59  ICD-9-CM: V13.29  2019 Yes    Overview Addendum 2019  8:36 AM by Elmira Habermann, RN     H/o postpartum eclampsia 2 weeks after last delivery. 24-hour urine and PIH labs next visit. Start ASA 81 mg daily at 12 weeks. Total protein: 105 mg/dl  MFM appt 10/11/09 at 2 pm                 Doing well, post partum day 2    Plan:   1. Discharge home today  2. Follow up in office in 6 weeks with Louis Peters MD   3. Post partum activity advised, diet as tolerated  4. Discharge Medications: medications prior to admission    Information for the patient's :  Gavi Funes [[id number]]   Vaginal, Spontaneous   Patient doing well without significant complaint. Voiding without difficulty, normal lochia. Patient is bottle feeding. Denies need for any pain medications. Current Facility-Administered Medications   Medication Dose Route Frequency       Vitals:  Visit Vitals  /75 (BP 1 Location: Right arm, BP Patient Position: At rest;Other (comment)) Comment (BP Patient Position): on telephone the whole time   Pulse 83   Temp 97.9 °F (36.6 °C)   Resp 16   LMP 2019 (Exact Date)   SpO2 100%   Breastfeeding Unknown     Temp (24hrs), Av.1 °F (36.7 °C), Min:97.9 °F (36.6 °C), Max:98.5 °F (36.9 °C)      Exam:         Patient without distress. Abdomen soft, fundus firm, nontender                 Lower extremities are negative for swelling, cords or tenderness.     Labs:     Lab Results   Component Value Date/Time    WBC 10.9 2020 02:45 PM    WBC 9.5 2019 10:08 AM    WBC 8.8 2019 10:25 AM    HGB 9.0 (L) 03/15/2020 03:55 AM    HGB 12.1 03/14/2020 02:45 PM    HGB 11.7 01/06/2020 10:59 AM    HCT 28.3 (L) 03/15/2020 03:55 AM    HCT 38.2 03/14/2020 02:45 PM    HCT 35.9 09/17/2019 10:08 AM    PLATELET 289 68/17/2724 02:45 PM    PLATELET 326 52/81/7329 10:08 AM    PLATELET 059 41/17/0492 10:25 AM       No results found for this or any previous visit (from the past 24 hour(s)).

## 2020-03-16 NOTE — PROGRESS NOTES
Saba Aquiles is progressing toward all goals.     Problem: Patient Education: Go to Patient Education Activity  Goal: Patient/Family Education  Outcome: Progressing Towards Goal     Problem: Pain  Goal: *Control of Pain  Outcome: Progressing Towards Goal     Problem: Patient Education: Go to Patient Education Activity  Goal: Patient/Family Education  Outcome: Progressing Towards Goal     Problem: Vaginal Delivery: Postpartum 2  Goal: Activity/Safety  Outcome: Progressing Towards Goal  Goal: Consults, if ordered  Outcome: Progressing Towards Goal  Goal: Nutrition/Diet  Outcome: Progressing Towards Goal  Goal: Discharge Planning  Outcome: Progressing Towards Goal  Goal: Medications  Outcome: Progressing Towards Goal  Goal: Treatments/Interventions/Procedures  Outcome: Progressing Towards Goal  Goal: Psychosocial  Outcome: Progressing Towards Goal     Problem: Vaginal Delivery: Discharge Outcomes  Goal: *Verbalizes name, dosage, time, side effects, and number of days to continue medications  Outcome: Progressing Towards Goal  Goal: *Describes available resources and support systems  Outcome: Progressing Towards Goal  Goal: *No signs and symptoms of infection  Outcome: Progressing Towards Goal  Goal: *Birth certificate information completed  Outcome: Progressing Towards Goal  Goal: *Received and verbalizes understanding of discharge plan and instructions  Outcome: Progressing Towards Goal  Goal: *Vital signs within defined limits  Outcome: Progressing Towards Goal  Goal: *Labs within defined limits  Outcome: Progressing Towards Goal  Goal: *Hemodynamically stable  Outcome: Progressing Towards Goal  Goal: *Optimal pain control at patient's stated goal  Outcome: Progressing Towards Goal  Goal: *Participates in infant care  Outcome: Progressing Towards Goal  Goal: *Demonstrates progressive activity  Outcome: Progressing Towards Goal  Goal: *Appropriate parent-infant bonding  Outcome: Progressing Towards Goal  Goal: *Tolerating diet  Outcome: Progressing Towards Goal

## 2020-03-16 NOTE — DISCHARGE SUMMARY
Obstetrical Discharge Summary     Name: Maria Antonia Sun MRN: [id number]  SSN: [id number]    YOB: 1990  Age: 30 y.o. Sex: female      Admit Date: 3/14/2020    Discharge Date: 3/16/2020    Admitting Physician: Jeff Cervantes MD     Attending Physician:  Frank Duran MD     Discharge Diagnoses:   Information for the patient's :  Zeyad Rape [[id number]]   Delivery of a 7 lb 2.1 oz (3.235 kg) female infant via Vaginal, Spontaneous on 3/14/2020 at 3:56 PM  by Júnior Martinez. Apgars were 9  and 9 . Additional Diagnoses:   Problem List as of 3/16/2020 Date Reviewed: 3/16/2020          Codes Class Noted - Resolved    Spontaneous vaginal delivery ICD-10-CM: O80  ICD-9-CM: 650  3/16/2020 - Present        Anemia associated with acute blood loss ICD-10-CM: D62  ICD-9-CM: 285.1  3/16/2020 - Present        History of bilateral breast reduction surgery (Chronic) ICD-10-CM: Z98.890  ICD-9-CM: V45.89  2020 - Present        History of eclampsia ICD-10-CM: Z87.59  ICD-9-CM: V13.29  2019 - Present    Overview Addendum 2019  8:36 AM by Ina Vines RN     H/o postpartum eclampsia 2 weeks after last delivery. 24-hour urine and PIH labs next visit. Start ASA 81 mg daily at 12 weeks.    Total protein: 105 mg/dl  M appt 10/11/09 at 2 pm             History of  premature rupture of membranes (PPROM) ICD-10-CM: Z87.59  ICD-9-CM: V13.29  2019 - Present        RESOLVED: IUP (intrauterine pregnancy), incidental ICD-10-CM: Z34.90  ICD-9-CM: V22.2  3/14/2020 - 3/16/2020        * (Principal) RESOLVED: 38 weeks gestation of pregnancy ICD-10-CM: Z3A.38  ICD-9-CM: V22.2  3/14/2020 - 3/16/2020        RESOLVED: Uterine contractions during pregnancy ICD-10-CM: O62.2  ICD-9-CM: 661.20  3/14/2020 - 3/16/2020        RESOLVED: Abdominal cramping affecting pregnancy ICD-10-CM: O26.899, R10.9  ICD-9-CM: 646.80, 789.00  3/11/2020 - 3/16/2020        RESOLVED: Abnormal glucose tolerance test ICD-10-CM: R73.09  ICD-9-CM: 790.22  2019 - 3/16/2020    Overview Addendum 2020  1:23 PM by PAPO Chua     135 mg/dl on 19  HGB A1C: 4.9 on 20  Declined scheduling 3-hr GTT on 2020             RESOLVED:  delivery ICD-10-CM: O60.10X0  ICD-9-CM: 644.21  2019 - 3/16/2020    Overview Addendum 2019 11:41 AM by Riley Elizondo NP     H/o PTD at 27 weeks, PPROM. No PTL. Pt was counseled by MFSTEPHANIE 10/11/19 regarding recommendation for [place] injections- pt has declined [place] and vaginal progesterone. RESOLVED: Obesity affecting pregnancy in first trimester ICD-10-CM: O99.211  ICD-9-CM: 649.13  2019 - 3/16/2020    Overview Addendum 3/9/2020 12:49 PM by Blanca Arshad MD     Morph U/S at [address] 20-21 weeks. Bi-weekly NSTs starting 37 weeks. Morph us 19  EFW 2lb 8oz 17%, vertex. Anesthesia consult appt 20- okay for delivery at [facility] per anesthesiologist- Dr. Kezia Mock  3/9/20  EFW 6lb 6oz 26%, vertex. Lab Results   Component Value Date/Time    Rubella, External Immune 2019    GrBStrep, External Negative 2020     Recent Labs     03/15/20  0355   HGB 9.0[facility] Course: Normal hospital course following the delivery. Patient Instructions:   Current Discharge Medication List      CONTINUE these medications which have NOT CHANGED    Details   PNV No12-Iron-FA-DSS-OM-3 29 mg iron-1 mg -50 mg CPKD Take  by mouth. aspirin 81 mg chewable tablet Take 81 mg by mouth daily. Reference my discharge instructions.     Follow-up Appointments   Procedures    FOLLOW UP VISIT Appointment in: 6 Weeks     Standing Status:   Standing     Number of Occurrences:   1     Order Specific Question:   Appointment in     Answer:   6 Weeks        Signed By:  Christina Bliss MD     2020                       BST

## 2020-03-16 NOTE — PROGRESS NOTES
Problem: Pain  Goal: *Control of Pain  Outcome: Progressing Towards Goal     Problem: Patient Education: Go to Patient Education Activity  Goal: Patient/Family Education  Outcome: Progressing Towards Goal     Problem: Vaginal Delivery: Postpartum 2  Goal: Activity/Safety  Outcome: Progressing Towards Goal  Goal: Nutrition/Diet  Outcome: Progressing Towards Goal  Goal: Discharge Planning  Outcome: Progressing Towards Goal  Goal: Medications  Outcome: Progressing Towards Goal  Goal: Treatments/Interventions/Procedures  Outcome: Progressing Towards Goal  Goal: Psychosocial  Outcome: Progressing Towards Goal

## 2020-03-16 NOTE — DISCHARGE INSTRUCTIONS
POST DELIVERY DISCHARGE INSTRUCTIONS    Name: Familia Ramirez  YOB: 1990  Primary Diagnosis: Active Problems:    IUP (intrauterine pregnancy), incidental (3/14/2020)      38 weeks gestation of pregnancy (3/14/2020)      Uterine contractions during pregnancy (3/14/2020)        General:     Diet/Diet Restrictions:  Eight 8-ounce glasses of fluid daily (water, juices); avoid excessive caffeine intake. Meals/snacks as desired which are high in fiber and carbohydrates and low in fat and cholesterol. Physical Activity / Restrictions / Safety:     Avoid heavy lifting, no more that 8 lbs. For 2-3 weeks; limit use of stairs to 2 times daily for the first week home. No driving for one week. Avoid intercourse 4-6 weeks, no douching or tampon use. Check with obstetrician before starting or resuming an exercise program.         Discharge Instructions/Special Treatment/Home Care Needs:     Continue prenatal vitamins. Continue to use squirt bottle with warm water on your episiotomy after each bathroom use until bleeding stops. If steri-strips applied to your incision, remove in 7-10 days. Call your doctor for the following:     Fever over 101 degrees by mouth. Vaginal bleeding heavier than a normal menstrual period or clot larger than a golf ball. Red streaks or increased swelling of legs, painful red streaks on your breast.  Painful urination, constipation and increased pain or swelling or discharge with your incision. If you feel extremely anxious or overwhelmed. If you have thoughts of harming yourself and/or your baby. Pain Management:     Pain Management:   Take Acetaminophen (Tylenol) or Ibuprofen (Advil, Motrin), as directed for pain. Use a warm Sitz bath 3 times daily to relieve episiotomy or hemorrhoidal discomfort. Heating pad to  incision as needed. For hemorrhoidal discomfort, use Tucks and Anusol cream as needed and directed. Follow-Up Care:      These are general instructions for a healthy lifestyle:    No smoking/ No tobacco products/ Avoid exposure to second hand smoke    Surgeon General's Warning:  Quitting smoking now greatly reduces serious risk to your health. Obesity, smoking, and sedentary lifestyle greatly increases your risk for illness    A healthy diet, regular physical exercise & weight monitoring are important for maintaining a healthy lifestyle    Recognize signs and symptoms of STROKE:    F-face looks uneven    A-arms unable to move or move unevenly    S-speech slurred or non-existent    T-time-call 911 as soon as signs and symptoms begin-DO NOT go       Back to bed or wait to see if you get better-TIME IS BRAIN.     Patient armband removed and shredded

## 2020-03-16 NOTE — PROGRESS NOTES
Received Bedside and Verbal shift change report from LOUIS Nj RN. Report included the following information SBAR, Kardex, Procedure Summary, Intake/Output, MAR and Recent Results. 0830: Shift assessment performed, no issues or concerns at this time.

## 2020-04-24 PROBLEM — Z87.59 HISTORY OF ECLAMPSIA: Status: RESOLVED | Noted: 2019-08-19 | Resolved: 2020-04-24

## 2020-04-24 PROBLEM — D62 ANEMIA ASSOCIATED WITH ACUTE BLOOD LOSS: Status: RESOLVED | Noted: 2020-03-16 | Resolved: 2020-04-24

## 2020-08-04 ENCOUNTER — VIRTUAL VISIT (OUTPATIENT)
Dept: SURGERY | Age: 30
End: 2020-08-04

## 2020-08-04 VITALS — WEIGHT: 236 LBS | HEIGHT: 61 IN | BODY MASS INDEX: 44.56 KG/M2

## 2020-08-04 DIAGNOSIS — E66.01 MORBID OBESITY WITH BMI OF 45.0-49.9, ADULT (HCC): ICD-10-CM

## 2020-08-04 DIAGNOSIS — E66.01 MORBID OBESITY (HCC): Primary | ICD-10-CM

## 2020-08-04 NOTE — PATIENT INSTRUCTIONS
New patient Instructions 1. Ensure all pre-operative insurances requirements are complete (ie; dietary visits, psychology consults, primary care documentation, etc) 2. Adhere to pre-operative weight loss / weight maintenance plan discussed in the office today. 3. Contact the office with any questions on pre-operative clearance issues (ie; cardiology work-up, pulmonary work-up, upper GI study, etc). 4. If a barium upper GI study has been ordered for your evaluation, make sure you are on liquids only the morning of the procedure. Walking for Exercise: Care Instructions Your Care Instructions Walking is one of the easiest ways to get the exercise you need for good health. A brisk, 30-minute walk each day can help you feel better and have more energy. It can help you lower your risk of disease. Walking can help you keep your bones strong and your heart healthy. Check with your doctor before you start a walking plan if you have heart problems, other health issues, or you have not been active in a long time. Follow your doctor's instructions for safe levels of exercise. Follow-up care is a key part of your treatment and safety. Be sure to make and go to all appointments, and call your doctor if you are having problems. It's also a good idea to know your test results and keep a list of the medicines you take. How can you care for yourself at home? Getting started · Start slowly and set a short-term goal. For example, walk for 5 or 10 minutes every day. · Bit by bit, increase the amount you walk every day. Try for at least 30 minutes on most days of the week. You also may want to swim, bike, or do other activities. · If finding enough time is a problem, it is fine to be active in blocks of 10 minutes or more throughout your day and week.  
· To get the heart-healthy benefits of walking, you need to walk briskly enough to increase your heart rate and breathing, but not so fast that you cannot talk comfortably. · Wear comfortable shoes that fit well and provide good support for your feet and ankles. Staying with your plan · After you've made walking a habit, set a longer-term goal. You may want to set a goal of walking briskly for longer or walking farther. Experts say to do 2½ hours of moderate activity a week. A faster heartbeat is what defines moderate-level activity. · To stay motivated, walk with friends, coworkers, or pets. · Use a phone kenzie or pedometer to track your steps each day. Set a goal to increase your steps. Once you get there, set a higher goal. Aim for 10,000 steps a day. · If the weather keeps you from walking outside, go for walks at the mall with a friend. Local schools and churches may have indoor gyms where you can walk. Fitting a walk into your workday · Park several blocks away from work, or get off the bus a few stops early. · Use the stairs instead of the elevator, at least for a few floors. · Suggest holding meetings with colleagues during a walk inside or outside the building. · Use the restroom that is the farthest from your desk or workstation. · Use your morning and afternoon breaks to take quick 15-minute walks. Staying safe · Know your surroundings. Walk in a well-lighted, safe place. If it is dark, walk with a partner. Wear light-colored clothing. If you can, buy a vest or jacket that reflects light. · Carry a cell phone for emergencies. · Drink plenty of water. Take a water bottle with you when you walk. This is very important if it is hot out. · Be careful not to slip on wet or icy ground. You can buy \"grippers\" for your shoes to help keep you from slipping. · Pay attention to your walking surface. Use sidewalks and paths. · If you have breathing problems like asthma or COPD, ask your doctor when it is safe for you to walk outdoors. Cold, dry air, smog, pollen, or other things in the air could cause breathing problems. Where can you learn more? Go to http://sebas-florentin.info/. Enter R159 in the search box to learn more about \"Walking for Exercise: Care Instructions. \" Current as of: August 19, 2018 Content Version: 12.1 © 6592-9794 [employer], [employer]. Care instructions adapted under license by [employer] (which disclaims liability or warranty for this information). If you have questions about a medical condition or this instruction, always ask your healthcare professional. [address] any warranty or liability for your use of this information.

## 2020-08-04 NOTE — PROGRESS NOTES
Bariatric Surgery Consultation    Subjective: The patient is a 30 y.o. obese female with a Body mass index is 44.59 kg/m². .  The patient is at her heaviest weight for the past 12 years. she has been overweight since starting birth control. she has been considering surgery since last year. she desires surgery at this time because of multiple health concerns and their lifestyle issues which are hindered by their weight. she has been referred by a coworker for evaluation and treatment of their obesity via surgical intervention. Vishal Christian has tried multiple diets in her lifetime most recently tried lifestyle changes. Patient states she does not have health problems and she would like weight loss surgery to prevent health problems in the future. Bariatric comorbidities present are   Patient Active Problem List   Diagnosis Code    History of  premature rupture of membranes (PPROM) Z87.59    History of bilateral breast reduction surgery Z98.890       The patient is considering the laparoscopic sleeve gastrectomy for surgical weight loss due to their ineffective progress with medical forms of weight loss and the urging of their physician who cares for their primary medical issues. The patient  now presents  for consideration for weight loss surgery understanding the benefits of this over a medical approach of weight loss as was discussed in our presentation on weight loss surgery. They have discussed their plans both with their family and primary care physician who is in support of their pursuit of such. The patient has not had health issues as of late and denies and gastrointestinal disturbances other than what is outlined below in their review of symptoms. All of their prior evaluations available by both their PCP's and specialists physicians have been reviewed today either in the Care Everywhere portal or scanned under the media tab.     I have spent a large portion of my initial consultation today reviewing the patients current dietary habits which have contributed to their health issues and obesity. I have suggested to them personally a dietary regimen that they can initiate now to help with their status as it pertains to their weight. They understand that the most important aspect of their journey through their weight loss endeavor will be their adherence to a new lifestyle of healthy eating behavior. They also understand that an adherence to an exercise program will not only help with weight loss but is ultimately important in weight maintenance. The patients goal weight is 180lb. These goals are consistant with expected outcomes of their desired operation. her Medical goals are resolution of these health issues. Patient Active Problem List    Diagnosis Date Noted    History of bilateral breast reduction surgery 2020    History of  premature rupture of membranes (PPROM) 2019     Past Surgical History:   Procedure Laterality Date    BREAST SURGERY PROCEDURE UNLISTED      HX BREAST REDUCTION Bilateral 2015    BILATERAL BREAST REDUCTION W/FREE NIPPLE GRAFT performed by Karen Altamirano MD at [address] HX OTHER SURGICAL  2007    excision bartholin gland cyst      Social History     Tobacco Use    Smoking status: Never Smoker    Smokeless tobacco: Never Used   Substance Use Topics    Alcohol use:  Yes     Alcohol/week: 2.0 standard drinks     Types: 2 Shots of liquor per week      Family History   Family history unknown: Yes        No Known Allergies       Review of Systems:       General - No history or complaints of unexpected fever, chills, or weight loss  Head/Neck - No history or complaints of headache, diplopia, dysphagia, hearing loss  Cardiac - No history or complaints of chest pain, palpitations, murmur, or shortness of breath  Pulmonary - No history or complaints of shortness of breath, productive cough, hemoptysis  Gastrointestinal - no reflux,no  abdominal pain, obstipation/constipation or blood per rectum  Genitourinary - No history or complaints of hematuria/dysuria, stress urinary incontinence symptoms, or renal lithiasis  Musculoskeletal - no joint pain,  no muscular weakness  Hematologic - No history or complaints of bleeding disorders,  No blood transfusions  Neurologic - No history or complaints of  migraine headaches, seizure activity, syncopal episodes, TIA or stroke  Integumentary - No history or complaints of rashes, abnormal nevi, skin cancer  Gynecological - No history of dysmenorrhea       Objective:     Visit Vitals   5' 1\" (1.549 m)   Wt 107 kg (236 lb)   BMI 44.59 kg/m²     Physical Examination: General appearance - alert, well appearing, and in no distress and oriented to person, place, and time  Mental status - alert, oriented to person, place, and time, normal mood, behavior, speech, dress, motor activity, and thought processes  Eyes - pupils equal and reactive, extraocular eye movements intact, sclera anicteric, left eye normal, right eye normal  Ears - right ear normal, left ear normal  Neck- good extension and flexion, no obvious swelling  Chest - good air movement  Heart - N/A  Abdomen - no obvious distension, scars as noted:   Neurological - alert, oriented, normal speech, no focal findings or movement disorder noted  Musculoskeletal - no swelling noted  Extremities - normal movement        Labs:       No results found for this or any previous visit (from the past 1440 hour(s)). Assessment:     Morbid obesity with comorbidity    Plan:     Laparoscopic sleeve gastrectomy    This is a 30 y.o. female with a BMI of Body mass index is 44.59 kg/m². and the weight-related co-morbidties as noted below. Valerie Paez meets the NIH criteria for bariatric surgery based upon the BMI of Body mass index is 44.59 kg/m². and multiple weight-related co-morbidties.  Valerie Paez has elected laparoscopic sleeve gastrectomy as her intervention of choice for treatment of morbid obestiy through surgical means secondary to its safety profile, rapid return to work  and decreases in operative risks over gastric bypass. In the office today, following Rui's history and physical examination, a 30 minute discussion regarding the anatomic alterations for the laparoscopic sleeve gastrectomy was undertaken. The dietary expectations and the patient and physician dependent factors for success were thoroughly discussed, to include the need for interval follow-up and long-term dietary changes associated with success. The possible complications of the sleeve gastrectomy  were also discussed, to include;death, DVT/PE, staple line leak, bleeding, stricture formation, infection, nutritional deficiencies and sleeve dilation. Specific weight related outcomes for success were also discussed with an emphasis on careful and close follow-up with the first year and eating behavior modification as the baseline and cyclical hunger return. The patient expressed an understanding of the above factors, and her questions were answered in their entirety. In addition, the patient attended a 1.5 hour power point seminar regarding obesity, surgical weight loss including, adjustable gastric band, gastric bypass, and sleeve gastrectomy. This discussion contrasted the different surgical techniques, mechanisms of actions and expected outcomes, and surgical and medical risks associated with each procedure. During this seminar, there was a long question and answer session where each questions was answered until there were no additional questions. Today, the patient had all of her questions answered and desires to proceed with  bariatric surgery initially choosing sleeve gastrectomy as her surgical option. Patient states that she has no Druze or other beliefs that would prevent her from accepting blood products.   She also denies that she and her family have any type of blood clotting or bleeding disorders. I will mail patient a packet of information with her insurance criteria, a lab slip and a copy of the psychologists. Secondary Diagnoses:     Dietary Intervention  - The patient is currently scheduled to see or has been followed by a bariatric nutritionist for an attempt at preoperative weight loss as has been dictated by their insurance carrier. They will be assessed at various times during their follow up to evaluate their progress depending on the length of time that is required once again by their carrier. I have explained the importance of preoperative weight loss and the benefits regarding lower surgical risk and also assisting the patient in reaching their weight loss goal.  Finally they understand their is a physiologic benefit from the standpoint of hepatic volume reduction preoperatively. I have reiterated the importance of a low carbohydrate and high protein regimen to achieve their stated goal.  Patient has a weight loss goal of 10 pounds. This visit with Vivian Cole was performed under virtual telemedicine guidelines during the coronavirus ([place]BV[place]-45) public health emergency on August 4, 2020 in a telephone encounter. They understand that this encounter could be a billable service, with coverage determined by their insurance carrier. They are aware that   they may receive a bill and have provided verbal consent for this visit. This visit was performed with the patient in their home environment and provider was   present at [facility]. I have spent over 30 minutes on this visit  both prior to the visits reviewing the patients chart and with the patient oh the phone. I have reviewed their medical history and discussed the plan of action to date.   They understand that they will be asked   to come to the office when our office is allowed normal patient interaction, as dictated by [facility] officials, for a face-to-face visit to rediscuss all of the things we  have talked about today.   During this visit we discussed the varieties of surgeries that we perform, how they would impact the patient from a weight loss standpoint   considering their medical issues and prior surgeries, and also the restrictions that the patient would have long-term with the operation that they have chosen    Signed By: Min Neri, [id number]     August 4, 2020

## 2020-09-16 ENCOUNTER — CLINICAL SUPPORT (OUTPATIENT)
Dept: SURGERY | Age: 30
End: 2020-09-16

## 2020-09-16 DIAGNOSIS — E66.01 MORBID OBESITY WITH BMI OF 45.0-49.9, ADULT (HCC): Primary | ICD-10-CM

## 2020-09-16 NOTE — PROGRESS NOTES
Medical Weight Loss Multi-Disciplinary Program    Name: Linus Ayala   : 1990    Session# 1  Date: 2020    Visit Vitals  Ht 5' 1\" (1.549 m)   Wt 112.5 kg (248 lb)   BMI 46.86 kg/m²      Patient has a weight loss goal of 10 pounds. Behavior Modification    Positive attitude  Comments: Pt is working on the following goals: Today the majority of our visit was spent reviewing patient's food recall and identifying areas for improvement. Educated  on the importance of eating 3 meals/day at regularly scheduled times including breakfast within 1st 1-2 hours of waking. Suggested patient use a protein supplement as a meal replacement instead of skipping OR as a between meal high protein snack. Also educated on the importance of including a protein with every meal and snack and reviewed lean sources. Lastly introduced  the Plate Method for Meal Planning and used food models to assist with visualizing recommended serving sizes. Dietitian: Chris Lane RD    Linus Ayala is a 30 y.o. female who present for a pre-op evaluation. There were no vitals taken for this visit. Past Medical History:   Diagnosis Date    Anemia associated with acute blood loss 3/16/2020    Epilepsy ([facility] [address]     Gestational hypertension     Seizures ([facility] [address] 2012    x 1 two weeks after child birth was on dilantin for 6 months    Vaginal delivery     x2           Procedure:  laparoscopic sleeve gastrectomy     Reasons for Surgery:  BMI > 40    Summary:  Pt given brief pre/post-op diet ed and diet hx reviewed. Pt instructed to follow a low calorie, low carbohydrate, high protein diet of about 9096-3324 calories daily. Pt set several goals. See below. What have you done in the past to try to lose weight? Physician supervised diet (+ phentermine). Pt has also tried lifestyle changes such as exercising regularly and changing eating habits (smaller portions, counting carbs/calories).  Pt was able to lose some weight but states results were \"minimal\". Why didn't you lose weight or keep the weight off?: Pt admits that motivation and consistency were barriers to weight loss and weight loss maintenance. Why do you think having weight loss surgery will make it possible for you to lose weight and keep it off? Pt acknowledges that having a smaller stomach will help reduce calorie intake and also reduce cravings/hunger. Dietary Instructions    Nutritional Hx: What is the number of meals you eat per day? 2- 3  Comment: Sometimes skips dinner     Do you eat between meals / snack? yes  Typical snack: nuts, applesauce    How fast do you eat your meals? rapid    How often do you eat fast food, restaurant food, or take out? 1 times a week but during covid this has reduced    How many sodas/sugared beverages do you drink per day? Lemonade 1x/day, sometimes peach sprite   How many caffeinated drinks do you have per day? sometimes    How much milk and/or juice do you drink per day? 1- 2 juice boxes    How much water do you drink per day? 4 ( 16.6 oz bottles)    How often do you consume alcohol? never;     Current Vitamins: none    Diet History:    Typical intake is as follows:  Breakfast: [employer] - + 4 chicken minis + small lemonade   Lunch: SKIPPED   Dinner: [employer]: popsicle, gummies,   Fluids: water, lemonade, sometimes soda, juice    Reviewed intake  Understanding label reading  Understanding low carbohydrates, low sugar, higher protein meals  Understanding proper portions  Dining outside home  Instruction given for personal dietary changes  Discussed perceived compliance  Comments: Pt given brief pre/post-op diet ed and diet hx reviewed.      Patient Education and Materials Provided:  Supplement [facility], B Vitamin Information, MVI Recommendations, Calcium Citrate Information, Bariatric Supplement Companies, Protein Supplement Information, Fluid Requirements, No Caffeine or Carbonation, No Alcohol for One Year Post Op, 3 Balanced Meals a Day, Food Group Guide, Good Choices Dining Out, No Snacks, No Concentrated Sweets, Support System at Home, Exercising, Support Group Information and Addressed Current Habits / Changes to make    Physical Activity/Exercise    Discussed Perceived Compliance  Reasonable Goals Set  Motivation  Comments: none    Exercise:  Do you currently have an exercise routine? no    Goals:   1. Work to increase to 3-4 small meals per day, with planned snacks as needed. Recommend following plate method for meal planning - focusing on lean protein, non-starchy vegetables, and measured amounts of starch. - Goal of  g protein and  g carbohydrate per day. - Recommend continuing protein supplement as meal replacement at least 1x/day  2. Increase non caloric fluid to 64 oz per day. Eliminate caffeine, added sugar, carbonation, and straws.               -Continue to work to decrease sugar sweetened beverages - goal of calorie free beverages only              -Must eliminate caffeine prior to surgery and avoid for ~6-8 weeks  3. Start activity regimen, work to increase ADL              -Try to start walking for at least 30-60 minutes 3-7 days per week  4. Start Complete MVI       Candidate for surgery (per RD):  PENDING  Adrien Ray RD  [unfilled]

## 2020-09-23 VITALS — HEIGHT: 61 IN | BODY MASS INDEX: 46.82 KG/M2 | WEIGHT: 248 LBS

## 2020-09-30 ENCOUNTER — VIRTUAL VISIT (OUTPATIENT)
Dept: SURGERY | Age: 30
End: 2020-09-30
Payer: COMMERCIAL

## 2020-09-30 VITALS — HEIGHT: 61 IN | WEIGHT: 248 LBS | BODY MASS INDEX: 46.82 KG/M2

## 2020-09-30 DIAGNOSIS — E66.01 MORBID OBESITY WITH BMI OF 45.0-49.9, ADULT (HCC): ICD-10-CM

## 2020-09-30 DIAGNOSIS — E66.01 MORBID OBESITY (HCC): Primary | ICD-10-CM

## 2020-09-30 PROCEDURE — 99215 OFFICE O/P EST HI 40 MIN: CPT | Performed by: SPECIALIST

## 2020-09-30 RX ORDER — LEVONORGESTREL 52 MG/1
1 INTRAUTERINE DEVICE INTRAUTERINE ONCE
COMMUNITY

## 2020-09-30 NOTE — PROGRESS NOTES
Bariatric Surgery Consultation    Subjective: The patient is a 30 y.o. obese female with a Body mass index is 46.86 kg/m². .  The patient is at her heaviest weight for the past 10 years. she has been overweight since age 23.   she has been considering surgery since last year. she desires surgery at this time because of multiple health concerns and their lifestyle issues which are hindered by their weight. she has been referred by his family physician Dr Manasa Rollins for evaluation and treatment of their obesity via surgical intervention. Mckayla Aiken has tried multiple diets in her lifetime most recently tried physician supervised, behavior modification, unsupervised diets, Weight Watchers and Atkins    Bariatric comorbidities present are   Patient Active Problem List   Diagnosis Code    History of  premature rupture of membranes (PPROM) Z87.59    History of bilateral breast reduction surgery Z98.890       The patient is considering laparoscopic sleeve gastrectomy for surgical weight loss due to their ineffective progress with medical forms of weight loss and the urging of their physician who cares for their primary medical issues. The patient  now presents  for consideration for weight loss surgery understanding the benefits of this over a medical approach of weight loss as was discussed in our presentation on weight loss surgery. They have discussed their plans both with their family and primary care physician who is in support of their pursuit of such. The patient has not had health issues as of late and denies and gastrointestinal disturbances other than what is outlined below in their review of symptoms. All of their prior evaluations available by both their PCP's and specialists physicians have been reviewed today either in the Care Everywhere portal or scanned under the media tab.     I have spent a large portion of my initial consultation today reviewing the patients current dietary habits which have contributed to their health issues and obesity. I have suggested to them personally a dietary regimen that they can initiate now to help with their status as it pertains to their weight. They understand that the most important aspect of their journey through their weight loss endeavor will be their adherence to a new lifestyle of healthy eating behavior. They also understand that an adherence to an exercise program will not only help with weight loss but is ultimately important in weight maintenance. The patients goal weight is 150lb. These goals are consistent with expected outcomes of their desired operation. her Medical goals are resolution of these health issues. Patient Active Problem List    Diagnosis Date Noted    History of bilateral breast reduction surgery 2020    History of  premature rupture of membranes (PPROM) 2019     Past Surgical History:   Procedure Laterality Date    BREAST SURGERY PROCEDURE UNLISTED      HX BREAST REDUCTION Bilateral 2015    BILATERAL BREAST REDUCTION W/FREE NIPPLE GRAFT performed by Hudson Lloyd MD at [address] HX OTHER SURGICAL  2007    excision bartholin gland cyst      Social History     Tobacco Use    Smoking status: Never Smoker    Smokeless tobacco: Never Used   Substance Use Topics    Alcohol use: Yes     Alcohol/week: 2.0 standard drinks     Types: 2 Shots of liquor per week     Comment: social      Family History   Problem Relation Age of Onset    Hypertension Mother       Current Outpatient Medications   Medication Sig Dispense Refill    levonorgestreL (Mirena) 20 mcg/24 hours (5 yrs) 52 mg IUD 1 Device by IntraUTERine route once.        No Known Allergies       Review of Systems:       General - No history or complaints of unexpected fever, chills, or weight loss  Head/Neck - No history or complaints of headache, diplopia, dysphagia, hearing loss  Cardiac - No history or complaints of chest pain, palpitations, murmur, or shortness of breath  Pulmonary - No history or complaints of shortness of breath, productive cough, hemoptysis  Gastrointestinal - minimal reflux,no  abdominal pain, obstipation/constipation or blood per rectum  Genitourinary - No history or complaints of hematuria/dysuria, stress urinary incontinence symptoms, or renal lithiasis  Musculoskeletal - no joint pain ,  no muscular weakness  Hematologic - No history or complaints of bleeding disorders,  No blood transfusions  Neurologic - No history or complaints of  migraine headaches, seizure activity, syncopal episodes, TIA or stroke  Integumentary - No history or complaints of rashes, abnormal nevi, skin cancer  Gynecological - spotting only               Objective:     Visit Vitals   5' 1\" (1.549 m)   Wt 112.5 kg (248 lb)   BMI 46.86 kg/m²       Physical Examination:        Physical Examination: General appearance - alert, well appearing, and in no distress and oriented to person, place, and time  Mental status - alert, oriented to person, place, and time, normal mood, behavior, speech, dress, motor activity, and thought processes  Eyes - pupils equal and reactive, extraocular eye movements intact, sclera anicteric, left eye normal, right eye normal  Ears - right ear normal, left ear normal  Neck- good extension and flexion, no obvious swelling  Chest - good air movement  Heart - N/A  Abdomen - no obvious distension, scars as noted. Neurological - alert, oriented, normal speech, no focal findings or movement disorder noted  Musculoskeletal - no swelling noted  Extremities - normal movement    Labs:       No results found for this or any previous visit (from the past 1440 hour(s)). Assessment:     Morbid obesity with comorbidity    Plan:     laparoscopic sleeve gastrectomy    This is a 30 y.o. female with a BMI of Body mass index is 46.86 kg/m². and the weight-related co-morbidties as noted below.  Osmin Harris meets the NIH criteria for bariatric surgery based upon the BMI of Body mass index is 46.86 kg/m². and multiple weight-related co-morbidties. Rufina Vazquez has elected laparoscopic sleeve gastrectomy as her intervention of choice for treatment of morbid obestiy through surgical means secondary to its safety profile, rapid return to work  and decreases in operative risks over gastric bypass. In the office today, following Rui's history and physical examination, a 30 minute discussion regarding the anatomic alterations for the laparoscopic sleeve gastrectomy was undertaken. The dietary expectations and the patient and physician dependent factors for success were thoroughly discussed, to include the need for interval follow-up and long-term dietary changes associated with success. The possible complications of the sleeve gastrectomy  were also discussed, to include;death, DVT/PE, staple line leak, bleeding, stricture formation, infection, nutritional deficiencies and sleeve dilation. Specific weight related outcomes for success were also discussed with an emphasis on careful and close follow-up with the first year and eating behavior modification as the baseline and cyclical hunger return. The patient expressed an understanding of the above factors, and her questions were answered in their entirety. In addition, the patient watched a seminar regarding obesity, surgical weight loss including, adjustable gastric band, gastric bypass, and sleeve gastrectomy. This discussion contrasted the different surgical techniques, mechanisms of actions and expected outcomes, and surgical and medical risks associated with each procedure. In office today we had a long question and answer session where each questions was answered until there were no additional questions. Today, the patient had all of her questions answered and desires to proceed with  bariatric surgery initially choosing sleeve gastrectomy as her surgical option.     Secondary Diagnoses: Signed By: Becky Benoit MD     September 30, 2020       This visit with Lannette Kanner was performed under virtual telemedicine guidelines during the coronavirus (WQSKS-82) public health emergency on 9/30/2020 in an interactive   fashion using [employer] me. They understand that this telemedicine encounter is a billable service, with coverage determined by their insurance carrier. They are aware that   they may receive a bill and have provided verbal consent for this virtual visit. This visit was performed with the patient in their home environment and provider was   present at [facility]. I have spent over  minutes on this visit  both prior to the visits reviewing the patients chart and with the patient face to face. I have reviewed their medical history, performed a telemedicine physical examination, and discussed the plan of action to date. They understand that they will be asked   to come to the office when our office is allowed normal patient interaction, as dictated by public health officials, for a face-to-face visit to rediscuss all of the things we  have talked about today. During this visit we discussed the varieties of surgeries that we perform, how they would impact the patient from a weight loss standpoint   considering their medical issues and prior surgeries, and also the restrictions that the patient would have long-term with the operation that they have chosen.     Micheline Ayers M.D.  9/30/2020

## 2020-10-07 ENCOUNTER — APPOINTMENT (OUTPATIENT)
Dept: GENERAL RADIOLOGY | Age: 30
End: 2020-10-07
Attending: SPECIALIST
Payer: COMMERCIAL

## 2020-10-07 ENCOUNTER — HOSPITAL ENCOUNTER (OUTPATIENT)
Age: 30
Setting detail: OUTPATIENT SURGERY
Discharge: HOME | End: 2020-10-07
Attending: SPECIALIST | Admitting: SPECIALIST
Payer: COMMERCIAL

## 2020-10-07 VITALS
RESPIRATION RATE: 18 BRPM | DIASTOLIC BLOOD PRESSURE: 85 MMHG | HEIGHT: 61 IN | TEMPERATURE: 97.4 F | BODY MASS INDEX: 48.15 KG/M2 | WEIGHT: 255 LBS | SYSTOLIC BLOOD PRESSURE: 138 MMHG | OXYGEN SATURATION: 97 % | HEART RATE: 85 BPM

## 2020-10-07 DIAGNOSIS — E66.01 MORBID OBESITY (HCC): ICD-10-CM

## 2020-10-07 PROCEDURE — 74011000255 HC RX REV CODE- 255: Performed by: SPECIALIST

## 2020-10-07 PROCEDURE — 76040000019: Performed by: SPECIALIST

## 2020-10-07 PROCEDURE — 74240 X-RAY XM UPR GI TRC 1CNTRST: CPT

## 2020-10-07 PROCEDURE — 2709999900 HC NON-CHARGEABLE SUPPLY: Performed by: SPECIALIST

## 2020-10-07 PROCEDURE — 77030040361 HC SLV COMPR DVT MDII -B: Performed by: SPECIALIST

## 2020-10-07 NOTE — PROCEDURES
Patient:Rui Mcacll   : 1990  Medical Record [facility]:[id number]            PREPROCEDURE DIAGNOSIS: This patient is preoperative for laparoscopic sleeve gastrectomyprocedure with a history of  reflux disease. POSTPROCEDURE DIAGNOSIS: This patient is preoperative for laparoscopic sleeve gastrectomyprocedure with a history of  reflux disease. PROCEDURES PERFORMED: Upper GI study with barium. ESTIMATED BLOOD LOSS: None. SPECIMENS: None. STATEMENT OF MEDICAL NECESSITY: The patient is a patient with a  longstanding history of obesity. They are now considering the laparoscopic sleeve gastrectomyprocedure as a means of surgical weight control and due to their history of reflux disease and are being assessed preoperatively for such. DESCRIPTION OF PROCEDURE: The patient was brought to the fluoroscopy unit and  was given thin barium. On swallowing of barium, they were noted to have  normal peristalsis of their esophagus. They had prompt filling of distal  esophagus with tapering into the gastroesophageal junction. There was no evidence of a hiatal hernia present. Contrast then filled the gastric cardia, fundus,body and pre pyloric region with no abnormalities noted. Contrast then exited the pylorus in normal fashion. No obstruction was noted. There was no evidence of reflux noted.     (normal anatomy)    Luna Trinidad MD

## 2020-10-23 ENCOUNTER — CLINICAL SUPPORT (OUTPATIENT)
Dept: SURGERY | Age: 30
End: 2020-10-23

## 2020-10-23 VITALS — WEIGHT: 255 LBS | BODY MASS INDEX: 48.15 KG/M2 | HEIGHT: 61 IN

## 2020-10-23 DIAGNOSIS — E66.01 MORBID OBESITY WITH BMI OF 45.0-49.9, ADULT (HCC): Primary | ICD-10-CM

## 2020-10-23 NOTE — PROGRESS NOTES
Medical Weight Loss Multi-Disciplinary Program    Name: Florecita Elliott   : 1990    Session# 2  Date: 10/23/2020    Visit Vitals  Ht 5' 1\" (1.549 m)   Wt 115.7 kg (255 lb)   BMI 48.18 kg/m²     Patient has a weight loss goal of 10 pounds. Consult wt  = 248 lbs (pre-op goal wt 238 lbs) - pt now has 17 lbs to lose. Dietary Instructions    Reviewed intake  Understanding low carbohydrates, low sugar, higher protein meals  Instruction given for personal dietary changes  Discussed perceived compliance  Comments: Diet hx reviewed and personal dietary changes discussed. Reviewed recommendation to follow [id number] calorie diet, working to reduce total carbohydrate intake to  g or less per day and increasing protein intake to  g per day, compared current intake to recommendations. Today the majority of our visit was spent reviewing patient's food recall and identifying areas for improvement. Educated  on the importance of eating 3 meals/day at regularly scheduled times including breakfast within 1st 1-2 hours of waking. Suggested patient use a protein supplement as a meal replacement instead of skipping OR as a between meal high protein snack. Also educated on the importance of including a protein with every meal and snack and reviewed lean sources. Lastly introduced  the Plate Method for Meal Planning and used food models to assist with visualizing recommended serving sizes. Pt switched from regular pasta to whole wheat pasta - educated pt on whole grain crabs vs. Regular carbs and that both do contain carbohdyrates. Nutritional Hx: What is the number of meals you eat per day? 2- 3 -  improving   Comment: Sometimes skips dinner    Do you eat between meals / snack? yes  Typical snack: nuts, applesauce    How fast do you eat your meals? rapid    How often do you eat fast food, restaurant food, or take out?  1 times a week but during covid this has reduced    How many sodas/sugared beverages do you drink per day? Eliminated sodas (2x/week lemonade)    How many caffeinated drinks do you have per day? sometimes    How much milk and/or juice do you drink per day? 1- 2 juice boxes - Still drinking juice, recommend pt try diet juice OR eliminate     How much water do you drink per day? 4 ( 16.6 oz bottles)    How often do you consume alcohol? never;     Current Vitamins: none    Diet History:    Typical intake is as follows:  Breakfast: Chic'tavon'a - + 4 chicken minis + small lemonade   Lunch: SKIPPED   Dinner: [facility]  Snacks: popsicle, gummies,   Fluids: water, lemonade, sometimes soda, juice  Physical Activity/Exercise    Discussed Perceived Compliance  Motivation    Patient has not started physical activity regimen, reinforced the importance of regular physical activity for total health and weight management. Suggested starting walking for at least 3-7 days per week for 30-60 minutes, patient was receptive to recommendation. Behavior Modification    Identify obstacles to trigger change  Achieving/Rewarding goals met  Positive attitude  Comments: Reinforced importance continuing to modify lifestyle patterns and behaviors to promote weight loss and long term weight maintenance       Goals:   1. Work to increase to 3-4 small meals per day, with planned snacks as needed. Recommend following plate method for meal planning - focusing on lean protein, non-starchy vegetables, and measured amounts of starch. - Goal of  g protein and  g carbohydrate per day. - Recommend continuing protein supplement as meal replacement at least 1x/day OR as high protein snack option  2. Increase non caloric fluid to 64 oz per day.   Eliminate caffeine, added sugar, carbonation, and straws.               -Continue to work to decrease sugar sweetened beverages - goal of calorie free beverages only              -Must eliminate caffeine prior to surgery and avoid for ~6-8 weeks   -Practice 30:30 rule,  food and flood   3. Start activity regimen, work to increase ADL  4. Start Complete MVI    Candidate for surgery (per RD):  PENDING    Dietitian: Brian Moscoso RD

## 2020-11-20 ENCOUNTER — CLINICAL SUPPORT (OUTPATIENT)
Dept: SURGERY | Age: 30
End: 2020-11-20

## 2020-11-20 VITALS — HEIGHT: 61 IN | BODY MASS INDEX: 47.2 KG/M2 | WEIGHT: 250 LBS

## 2020-11-20 DIAGNOSIS — E66.01 MORBID OBESITY WITH BMI OF 45.0-49.9, ADULT (HCC): Primary | ICD-10-CM

## 2020-11-20 NOTE — PROGRESS NOTES
Medical Weight Loss Multi-Disciplinary Program    Name: Lori Pa   : 1990    Session# 3  Date: 2020    Visit Vitals  Ht 5' 1\" (1.549 m)   Wt 113.4 kg (250 lb)   BMI 47.24 kg/m²     Patient has a weight loss goal of 10 pounds. Consult wt  = 248 lbs (pre-op goal wt 238 lbs) - pt now has 17 lbs to lose. Dietary Instructions    Reviewed intake  Understanding low carbohydrates, low sugar, higher protein meals  Instruction given for personal dietary changes  Discussed perceived compliance  Comments: Diet hx reviewed and personal dietary changes discussed. Reviewed recommendation to follow [id number] calorie diet, working to reduce total carbohydrate intake to  g or less per day and increasing protein intake to  g per day, compared current intake to recommendations. Today the majority of our visit was spent reviewing patient's food recall and identifying areas for improvement. Educated  on the importance of eating 3 meals/day at regularly scheduled times including breakfast within 1st 1-2 hours of waking. Suggested patient use a protein supplement as a meal replacement instead of skipping OR as a between meal high protein snack. Also educated on the importance of including a protein with every meal and snack and reviewed lean sources. Lastly introduced  the Plate Method for Meal Planning and used food models to assist with visualizing recommended serving sizes. Pt has reduced her carb intake. Pt is eating more lean protein. Nutritional Hx: What is the number of meals you eat per day? 2- 3 -  improving   Comment: Smaller meals or protein bar    Do you eat between meals / snack? yes  Typical snack: nuts, applesauce    How fast do you eat your meals? rapid    How often do you eat fast food, restaurant food, or take out? 1 times a week but during covid this has reduced    How many sodas/sugared beverages do you drink per day?  Eliminated sodas (2x/week lemonade)    How many caffeinated drinks do you have per day? sometimes    How much milk and/or juice do you drink per day? 1- 2 juice boxes - Still drinking juice, recommend pt try diet juice OR eliminate     How much water do you drink per day? 4 ( 16.6 oz bottles)    How often do you consume alcohol? never;     Current Vitamins: none    Diet History:    Typical intake is as follows:  Breakfast: Chic'tavon'a - + 4 chicken minis + small lemonade OR boiled egg + 1 cup fruit  Lunch: sandwich (wheat bread, lettuce, deli meat, mustard)  Dinner:  baked chicken/turkey + green beans/cabbage (no carbs) OR protein bar  Snacks: Reduced   Fluids: water, lemonade, sometimes soda, juice  Physical Activity/Exercise    Discussed Perceived Compliance  Motivation    Patient has somewhat started physical activity regimen, reinforced the importance of regular physical activity for total health and weight management. Walking around neighborhood whenever she can (1x/week, 30 min). Suggested starting walking for at least 3-7 days per week for 30-60 minutes, patient was receptive to recommendation. Behavior Modification    Identify obstacles to trigger change  Achieving/Rewarding goals met  Positive attitude  Comments: Reinforced importance continuing to modify lifestyle patterns and behaviors to promote weight loss and long term weight maintenance       Goals:   1. Work to increase to 3-4 small meals per day, with planned snacks as needed. Recommend following plate method for meal planning - focusing on lean protein, non-starchy vegetables, and measured amounts of starch. - Goal of  g protein and  g carbohydrate per day. - Recommend continuing protein supplement as meal replacement at least 1x/day OR as high protein snack option  2. Increase non caloric fluid to 64 oz per day.   Eliminate caffeine, added sugar, carbonation, and straws.               -Continue to work to decrease sugar sweetened beverages - goal of calorie free beverages only              -Must eliminate caffeine prior to surgery and avoid for ~6-8 weeks   -Practice 30:30 rule,  food and flood   3. Start activity regimen, work to increase ADL  4. Start Complete MVI    Candidate for surgery (per RD):  PENDING    Dietitian: Kacey Ordonez RD

## 2020-12-17 ENCOUNTER — CLINICAL SUPPORT (OUTPATIENT)
Dept: SURGERY | Age: 30
End: 2020-12-17

## 2020-12-17 VITALS — WEIGHT: 249 LBS | BODY MASS INDEX: 47.01 KG/M2 | HEIGHT: 61 IN

## 2020-12-17 DIAGNOSIS — E66.01 MORBID OBESITY WITH BMI OF 45.0-49.9, ADULT (HCC): Primary | ICD-10-CM

## 2020-12-17 NOTE — PROGRESS NOTES
Medical Weight Loss Multi-Disciplinary Program    Name: Glynn Chapman   : 1990    Session# 4  Date: 2020    Visit Vitals  Ht 5' 1\" (1.549 m)   Wt 112.9 kg (249 lb)   BMI 47.05 kg/m²     Patient has a weight loss goal of 10 pounds. Consult wt  = 248 lbs (pre-op goal wt 238 lbs) - pt now has 17 lbs to lose. Dietary Instructions    Reviewed intake  Understanding low carbohydrates, low sugar, higher protein meals  Instruction given for personal dietary changes  Discussed perceived compliance  Comments: Diet hx reviewed and personal dietary changes discussed. Today we spent majority of session reviewing key diet principles and beginning to discuss post operative diet advancement guidelines. Reinforcing the importance of adequate hydration and protein intake - emphasizing the role of protein supplements in the post operative diet. Discussed vitamin and mineral supplementation forever following surgery. Encouraged patient to review key diet principles of surgery and we will discuss individual questions or concerns. Patient was receptive to education. Pt has reduced her carb intake. Pt is eating more lean protein. Nutritional Hx: What is the number of meals you eat per day? 2- 3 -  improving   Comment: Smaller meals or protein bar    Do you eat between meals / snack? yes  Typical snack: nuts, applesauce    How fast do you eat your meals? rapid    How often do you eat fast food, restaurant food, or take out? 1 times a week but during covid this has reduced    How many sodas/sugared beverages do you drink per day? Eliminated sodas (2x/week lemonade)    How many caffeinated drinks do you have per day? sometimes    How much milk and/or juice do you drink per day? 1- 2 juice boxes - Still drinking juice, recommend pt try diet juice OR eliminate     How much water do you drink per day?  4 ( 16.6 oz bottles)    How often do you consume alcohol? never;     Current Vitamins: Multivitamin    Diet History:    Typical intake is as follows:  Breakfast: Chic'tavon'a - + 4 chicken minis + small lemonade OR boiled egg + 1 cup fruit  Lunch: sandwich (wheat bread, lettuce, deli meat, mustard)  Dinner:  baked chicken/turkey + green beans/cabbage (no carbs) OR protein bar  Snacks: Reduced   Fluids: water, lemonade, sometimes soda, juice  Physical Activity/Exercise    Discussed Perceived Compliance  Motivation    Patient has somewhat started physical activity regimen, reinforced the importance of regular physical activity for total health and weight management. Walking around neighborhood whenever she can (1x/week, 30 min). Suggested starting walking for at least 3-7 days per week for 30-60 minutes, patient was receptive to recommendation. Behavior Modification    Identify obstacles to trigger change  Achieving/Rewarding goals met  Positive attitude  Comments: Reinforced importance continuing to modify lifestyle patterns and behaviors to promote weight loss and long term weight maintenance       Goals:   1. Work to increase to 3-4 small meals per day, with planned snacks as needed. Recommend following plate method for meal planning - focusing on lean protein, non-starchy vegetables, and measured amounts of starch. - Goal of  g protein and  g carbohydrate per day. - Recommend continuing protein supplement as meal replacement at least 1x/day OR as high protein snack option  2. Increase non caloric fluid to 64 oz per day. Eliminate caffeine, added sugar, carbonation, and straws.               -Continue to work to decrease sugar sweetened beverages - goal of calorie free beverages only              -Must eliminate caffeine prior to surgery and avoid for ~6-8 weeks   -Practice 30:30 rule,  food and flood   3.  Start activity regimen, work to increase ADL  4. Start Complete MVI    Candidate for surgery (per RD): YES    Dietitian: Orin Oconnor RD

## 2021-01-11 ENCOUNTER — OFFICE VISIT (OUTPATIENT)
Dept: SURGERY | Age: 31
End: 2021-01-11
Payer: COMMERCIAL

## 2021-01-11 ENCOUNTER — TELEPHONE (OUTPATIENT)
Dept: SURGERY | Age: 31
End: 2021-01-11

## 2021-01-11 VITALS
HEART RATE: 83 BPM | DIASTOLIC BLOOD PRESSURE: 82 MMHG | BODY MASS INDEX: 48.03 KG/M2 | WEIGHT: 254.4 LBS | SYSTOLIC BLOOD PRESSURE: 130 MMHG | RESPIRATION RATE: 16 BRPM | HEIGHT: 61 IN | OXYGEN SATURATION: 98 %

## 2021-01-11 DIAGNOSIS — E66.01 MORBID OBESITY (HCC): ICD-10-CM

## 2021-01-11 DIAGNOSIS — G89.18 POST-OP PAIN: Primary | ICD-10-CM

## 2021-01-11 DIAGNOSIS — G40.909 NONINTRACTABLE EPILEPSY WITHOUT STATUS EPILEPTICUS, UNSPECIFIED EPILEPSY TYPE (HCC): ICD-10-CM

## 2021-01-11 DIAGNOSIS — O13.9 GESTATIONAL HYPERTENSION, ANTEPARTUM: ICD-10-CM

## 2021-01-11 DIAGNOSIS — E66.01 MORBID OBESITY WITH BODY MASS INDEX (BMI) OF 40.0 TO 49.9 (HCC): ICD-10-CM

## 2021-01-11 DIAGNOSIS — E66.01 MORBID OBESITY (HCC): Primary | ICD-10-CM

## 2021-01-11 DIAGNOSIS — Z78.9 USES BIRTH CONTROL: ICD-10-CM

## 2021-01-11 DIAGNOSIS — Z98.890 HISTORY OF BILATERAL BREAST REDUCTION SURGERY: Chronic | ICD-10-CM

## 2021-01-11 DIAGNOSIS — Z01.812 BLOOD TESTS PRIOR TO TREATMENT OR PROCEDURE: ICD-10-CM

## 2021-01-11 DIAGNOSIS — R56.9 SEIZURES (HCC): ICD-10-CM

## 2021-01-11 PROCEDURE — 99215 OFFICE O/P EST HI 40 MIN: CPT | Performed by: SPECIALIST

## 2021-01-11 RX ORDER — HYDROCODONE BITARTRATE AND ACETAMINOPHEN 5; 300 MG/1; MG/1
1 TABLET ORAL
Qty: 30 TAB | Refills: 0 | Status: SHIPPED | OUTPATIENT
Start: 2021-01-11 | End: 2021-01-11 | Stop reason: ALTCHOICE

## 2021-01-11 RX ORDER — OXYCODONE AND ACETAMINOPHEN 5; 325 MG/1; MG/1
1 TABLET ORAL
Qty: 30 TAB | Refills: 0 | Status: SHIPPED | OUTPATIENT
Start: 2021-01-11 | End: 2021-01-13

## 2021-01-11 RX ORDER — ENOXAPARIN SODIUM 100 MG/ML
40 INJECTION SUBCUTANEOUS EVERY 12 HOURS
Qty: 14 SYRINGE | Refills: 0 | Status: SHIPPED | OUTPATIENT
Start: 2021-01-11 | End: 2021-01-13

## 2021-01-11 NOTE — H&P (VIEW-ONLY)
Sleeve Gastrectomy - History and Physical 
 
Subjective: The patient is a 26 y.o. obese female with a Body mass index is 48.07 kg/m². .   she presents now to review their work up to date to see if they are a candidate for surgery and whether or not to proceed with the previously requested procedure. Bariatric comorbidities continue to include:  
Patient Active Problem List  
Diagnosis Code  History of  premature rupture of membranes (PPROM) Z87.59  
 History of bilateral breast reduction surgery Z98.890  Epilepsy ([facility] [address] G40.909  Gestational hypertension O13.9  Seizures ([facility] [address] R56.9  Morbid obesity (HCC) E66.01  
 Morbid obesity with body mass index (BMI) of 40.0 to 49.9 ([facility]) E66.01  
 Uses birth control Z78.9 They have been generally well prior to this visit and have had no recent significant illnesses. The patient has had no gastrointestinal issues that would preclude them from proceeding with the surgery they have chosen. Brynn Rico has recently tried a preoperative weight loss program  in addition to seeing a bariatric nutritionist preoperatively. We have discussed on at least one other occasion about the various types of surgical weight loss procedures and they have considered these options after our initial consultation. We have once again discussed these procedures in detail and they have now decided on a surgical procedure. They present today to discuss this and confirm that their evaluation pre operatively is acceptable to continue with surgery. The patient desires laparoscopic sleeve gastrectomy for surgical weight loss. The patients goal weight is 150lb. (this represents a BMI of 27) Patient Active Problem List  
 Diagnosis Date Noted  Epilepsy ([facility] [address]  Gestational hypertension  Morbid obesity ([facility] [address]  Morbid obesity with body mass index (BMI) of 40.0 to 49.9 ([facility] [address]  Uses birth control  History of bilateral breast reduction surgery 2020  
 History of  premature rupture of membranes (PPROM) 2019  Seizures ([facility] [address] 2012 Past Surgical History:  
Procedure Laterality Date  HX BREAST REDUCTION Bilateral 2015 BILATERAL BREAST REDUCTION W/FREE NIPPLE GRAFT performed by Esperanza Lopez MD at [address] HX OTHER SURGICAL  2007  
 excision bartholin gland cyst  
  
Social History Tobacco Use  Smoking status: Never Smoker  Smokeless tobacco: Never Used Substance Use Topics  Alcohol use: Yes Alcohol/week: 2.0 standard drinks Types: 2 Shots of liquor per week Comment: social  
  
Family History Problem Relation Age of Onset  Hypertension Mother Current Outpatient Medications Medication Sig Dispense Refill  enoxaparin (LOVENOX) 40 mg/0.4 mL 0.4 mL by SubCUTAneous route every twelve (12) hours every twelve (12) hours for 7 days. Indications: deep vein thrombosis prevention 14 Syringe 0  
 oxyCODONE-acetaminophen (PERCOCET) 5-325 mg per tablet Take 1 Tab by mouth every four (4) hours as needed for Pain for up to 5 days. Max Daily Amount: 6 Tabs. 30 Tab 0  
 ergocalciferol (ERGOCALCIFEROL) 1,250 mcg (50,000 unit) capsule  levonorgestreL (Mirena) 20 mcg/24 hours (5 yrs) 52 mg IUD 1 Device by IntraUTERine route once. No Known Allergies Review of Systems:  
 
General - No history or complaints of unexpected fever, chills, or weight loss Head/Neck - No history or complaints of headache, diplopia, dysphagia, hearing loss Cardiac - No history or complaints of chest pain, palpitations, murmur, or shortness of breath Pulmonary - No history or complaints of shortness of breath, productive cough, hemoptysis Gastrointestinal - (+) history of reflux, No history or complaints abdominal pain, obstipation/constipation, blood per rectum Genitourinary - No history or complaints of hematuria/dysuria, stress urinary incontinence symptoms, or renal lithiasis Musculoskeletal - No history or complaints of joint pain or muscular weakness Hematologic - No history or complaints of bleeding disorders, blood transfusions, sickle cell anemia Neurologic - No history or complaints of  migraine headaches, seizure activity, syncopal episodes, TIA or stroke Integumentary - No history or complaints of rashes, abnormal nevi, skin cancer Gynecological - unremarkable Objective:  
 
Visit Vitals /82 (BP 1 Location: Left arm, BP Patient Position: Sitting) Pulse 83 Resp 16 Ht 5' 1\" (1.549 m) Wt 115.4 kg (254 lb 6.4 oz) SpO2 98% BMI 48.07 kg/m² Physical Examination: General appearance - alert, well appearing, and in no distress Mental status - alert, oriented to person, place, and time Eyes - pupils equal and reactive, extraocular eye movements intact Ears - bilateral TM's and external ear canals normal 
Nose - normal and patent, no erythema, discharge or polyps Mouth - mucous membranes moist, pharynx normal without lesions Neck - supple, no significant adenopathy Lymphatics - no palpable lymphadenopathy, no hepatosplenomegaly Chest - clear to auscultation, no wheezes, rales or rhonchi, symmetric air entry Heart - normal rate, regular rhythm, normal S1, S2, no murmurs, rubs, clicks or gallops Abdomen - soft, nontender, nondistended, no masses or organomegaly Back exam - full range of motion, no tenderness, palpable spasm or pain on motion Neurological - alert, oriented, normal speech, no focal findings or movement disorder noted Musculoskeletal - no joint tenderness, deformity or swelling Extremities - peripheral pulses normal, no pedal edema, no clubbing or cyanosis Skin - normal coloration and turgor, no rashes, no suspicious skin lesions noted Labs / Preoperative Evaluation: Recent Results (from the past 1008 hour(s)) SURGICAL PATHOLOGY Collection Time: 12/10/20  3:30 PM  
Result Value Ref Range Diagnosis synopsis: Comment Specimen Comment Diagnosis Comment Gross Description Comment Electronically signed by Comment CPT codes Comment CPT Disclaimer Comment Clinician provided ICD Comment PATHOLOGIST PROVIDED ICD N72 PDF Image . Assessment: Morbid obesity with comorbidity Plan:  
 
laparoscopic sleeve gastrectomy This is a 26 y.o. female with a BMI of Body mass index is 48.07 kg/m². and the weight-related co-morbidties as noted above. Rebecca Choudhury meets the NIH criteria for bariatric surgery based upon the BMI of Body mass index is 48.07 kg/m². and multiple weight-related co-morbidties. Rebecca Choudhury has elected laparoscopic sleeve gastrectomy as her intervention of choice for treatment of morbid obestiy through surgical means secondary to its safety profile, rapid return to work  and decreases in operative risks over gastric bypass. In the office today, following Riu's history and physical examination, a 40 minute discussion regarding the anatomic alterations for the laparoscopic sleeve gastrectomy was undertaken. The dietary expectations and the patient  dependent factors for success were thoroughly discussed, to include the need for interval follow-up and long-term dietary changes associated with success. The possible complications of the sleeve gastrectomy  were also discussed, to include;death, DVT/PE, staple line leak, bleeding, stricture formation, infection, nutritional deficiencies and sleeve dilation. Specific weight related outcomes for success were also discussed with an emphasis on careful and close follow-up with the first year and eating behavior modification as the baseline and cyclical hunger return. The patient expressed an understanding of the above factors, and her questions were answered in their entirety. In addition, the patient attended a 1.5 hour power point seminar regarding obesity, surgical weight loss including, adjustable gastric band, gastric bypass, and sleeve gastrectomy. This discussion contrasted the different surgical techniques, mechanisms of actions and expected outcomes, and surgical and medical risks associated with each procedure. During this seminar, there was a long question and answer session where each questions was answered until there were no additional questions. Today, the patient had all of her questions answered and the decision was made today that the patient's preoperative evaluation is acceptable for them  to proceed with bariatric surgery  choosing the sleeve gastrectomy as her surgical option. The patient understands the plan of action There has been no change to their overall medical or surgical history since their consult visit and they have been on no steroids in any form. There has been no exposure to nicotine in any form Pre-op UGI was normal  
 
They were not asked to obtain any special clearances prior to proceeding to surgery Her post-op meds have been sent to her pharmacy Secondary Diagnoses: DVT / Pulmonary Embolus Risk - The patient is at a higher risk for post operative DVT / pulmonary embolus secondary to their morbid obese status, relative sedentary lifestyle, and impending general anesthetic. We will plan to use anticoagulation therapy pre and post operative as well as  pneumatic compression devices and encourage ambulation once on the hospital nursing floor. The need for possible at home anticoagulation therapy has also been discussed and any decision on this matter will be made during post operative evaluations. The patient understands that their efforts at ambulation are of vital importance to reduce the risk of this complication thus placing significant burden on them as to the prevention of such issues. Signs and symptoms of DVT / PE have been discussed with the patient and they have been instructed to call the office if any these occur in the \"at home\" post op phase Signed By: Asif López MD   
 January 11, 2021

## 2021-01-11 NOTE — PROGRESS NOTES
Sleeve Gastrectomy - History and Physical    Subjective: The patient is a 26 y.o. obese female with a Body mass index is 48.07 kg/m². .   she presents now to review their work up to date to see if they are a candidate for surgery and whether or not to proceed with the previously requested procedure. Bariatric comorbidities continue to include:   Patient Active Problem List   Diagnosis Code    History of  premature rupture of membranes (PPROM) Z87.59    History of bilateral breast reduction surgery Z98.890    Epilepsy ([facility] [address] G40.909    Gestational hypertension O13.9    Seizures ([address] R56.9    Morbid obesity ([address] E66.01    Morbid obesity with body mass index (BMI) of 40.0 to 49.9 ([facility] [address] E66.01    Uses birth control Z78.9       They have been generally well prior to this visit and have had no recent significant illnesses. The patient has had no gastrointestinal issues that would preclude them from proceeding with the surgery they have chosen. Mihir Stinson has recently tried a preoperative weight loss program  in addition to seeing a bariatric nutritionist preoperatively. We have discussed on at least one other occasion about the various types of surgical weight loss procedures and they have considered these options after our initial consultation. We have once again discussed these procedures in detail and they have now decided on a surgical procedure. They present today to discuss this and confirm that their evaluation pre operatively is acceptable to continue with surgery. The patient desires laparoscopic sleeve gastrectomy for surgical weight loss. The patients goal weight is 150lb.  (this represents a BMI of 27)    Patient Active Problem List    Diagnosis Date Noted    Epilepsy ([facility] [address]     Gestational hypertension     Morbid obesity ([facility] [address]     Morbid obesity with body mass index (BMI) of 40.0 to 49.9 ([address]     Uses birth control     History of bilateral breast reduction surgery 2020    History of  premature rupture of membranes (PPROM) 2019    Seizures ([facility] [address] 2012     Past Surgical History:   Procedure Laterality Date    HX BREAST REDUCTION Bilateral 2015    BILATERAL BREAST REDUCTION W/FREE NIPPLE GRAFT performed by Mariola Roe MD at [address] HX OTHER SURGICAL  2007    excision bartholin gland cyst      Social History     Tobacco Use    Smoking status: Never Smoker    Smokeless tobacco: Never Used   Substance Use Topics    Alcohol use: Yes     Alcohol/week: 2.0 standard drinks     Types: 2 Shots of liquor per week     Comment: social      Family History   Problem Relation Age of Onset    Hypertension Mother       Current Outpatient Medications   Medication Sig Dispense Refill    enoxaparin (LOVENOX) 40 mg/0.4 mL 0.4 mL by SubCUTAneous route every twelve (12) hours every twelve (12) hours for 7 days. Indications: deep vein thrombosis prevention 14 Syringe 0    oxyCODONE-acetaminophen (PERCOCET) 5-325 mg per tablet Take 1 Tab by mouth every four (4) hours as needed for Pain for up to 5 days. Max Daily Amount: 6 Tabs. 30 Tab 0    ergocalciferol (ERGOCALCIFEROL) 1,250 mcg (50,000 unit) capsule       levonorgestreL (Mirena) 20 mcg/24 hours (5 yrs) 52 mg IUD 1 Device by IntraUTERine route once.        No Known Allergies     Review of Systems:     General - No history or complaints of unexpected fever, chills, or weight loss  Head/Neck - No history or complaints of headache, diplopia, dysphagia, hearing loss  Cardiac - No history or complaints of chest pain, palpitations, murmur, or shortness of breath  Pulmonary - No history or complaints of shortness of breath, productive cough, hemoptysis  Gastrointestinal - (+) history of reflux, No history or complaints abdominal pain, obstipation/constipation, blood per rectum  Genitourinary - No history or complaints of hematuria/dysuria, stress urinary incontinence symptoms, or renal lithiasis  Musculoskeletal - No history or complaints of joint pain or muscular weakness  Hematologic - No history or complaints of bleeding disorders, blood transfusions, sickle cell anemia  Neurologic - No history or complaints of  migraine headaches, seizure activity, syncopal episodes, TIA or stroke  Integumentary - No history or complaints of rashes, abnormal nevi, skin cancer  Gynecological - unremarkable    Objective:     Visit Vitals  /82 (BP 1 Location: Left arm, BP Patient Position: Sitting)   Pulse 83   Resp 16   Ht 5' 1\" (1.549 m)   Wt 115.4 kg (254 lb 6.4 oz)   SpO2 98%   BMI 48.07 kg/m²       Physical Examination: General appearance - alert, well appearing, and in no distress  Mental status - alert, oriented to person, place, and time  Eyes - pupils equal and reactive, extraocular eye movements intact  Ears - bilateral TM's and external ear canals normal  Nose - normal and patent, no erythema, discharge or polyps  Mouth - mucous membranes moist, pharynx normal without lesions  Neck - supple, no significant adenopathy  Lymphatics - no palpable lymphadenopathy, no hepatosplenomegaly  Chest - clear to auscultation, no wheezes, rales or rhonchi, symmetric air entry  Heart - normal rate, regular rhythm, normal S1, S2, no murmurs, rubs, clicks or gallops  Abdomen - soft, nontender, nondistended, no masses or organomegaly  Back exam - full range of motion, no tenderness, palpable spasm or pain on motion  Neurological - alert, oriented, normal speech, no focal findings or movement disorder noted  Musculoskeletal - no joint tenderness, deformity or swelling  Extremities - peripheral pulses normal, no pedal edema, no clubbing or cyanosis  Skin - normal coloration and turgor, no rashes, no suspicious skin lesions noted    Labs / Preoperative Evaluation:        Recent Results (from the past 1008 hour(s))   SURGICAL PATHOLOGY    Collection Time: 12/10/20  3:30 PM   Result Value Ref Range    Diagnosis synopsis: Comment     Specimen Comment     Diagnosis Comment     Gross Description Comment     Electronically signed by Comment     CPT codes Comment     CPT Disclaimer Comment     Clinician provided ICD Comment     PATHOLOGIST PROVIDED ICD N72     PDF Image . Assessment:     Morbid obesity with comorbidity    Plan:     laparoscopic sleeve gastrectomy    This is a 26 y.o. female with a BMI of Body mass index is 48.07 kg/m². and the weight-related co-morbidties as noted above. Florinda Weeks meets the NIH criteria for bariatric surgery based upon the BMI of Body mass index is 48.07 kg/m². and multiple weight-related co-morbidties. Florinda Weeks has elected laparoscopic sleeve gastrectomy as her intervention of choice for treatment of morbid obestiy through surgical means secondary to its safety profile, rapid return to work  and decreases in operative risks over gastric bypass. In the office today, following Rui's history and physical examination, a 40 minute discussion regarding the anatomic alterations for the laparoscopic sleeve gastrectomy was undertaken. The dietary expectations and the patient  dependent factors for success were thoroughly discussed, to include the need for interval follow-up and long-term dietary changes associated with success. The possible complications of the sleeve gastrectomy  were also discussed, to include;death, DVT/PE, staple line leak, bleeding, stricture formation, infection, nutritional deficiencies and sleeve dilation. Specific weight related outcomes for success were also discussed with an emphasis on careful and close follow-up with the first year and eating behavior modification as the baseline and cyclical hunger return. The patient expressed an understanding of the above factors, and her questions were answered in their entirety.     In addition, the patient attended a 1.5 hour power point seminar regarding obesity, surgical weight loss including, adjustable gastric band, gastric bypass, and sleeve gastrectomy. This discussion contrasted the different surgical techniques, mechanisms of actions and expected outcomes, and surgical and medical risks associated with each procedure. During this seminar, there was a long question and answer session where each questions was answered until there were no additional questions. Today, the patient had all of her questions answered and the decision was made today that the patient's preoperative evaluation is acceptable for them  to proceed with bariatric surgery  choosing the sleeve gastrectomy as her surgical option. The patient understands the plan of action    There has been no change to their overall medical or surgical history since their consult visit and they have been on no steroids in any form. There has been no exposure to nicotine in any form    Pre-op UGI was normal     They were not asked to obtain any special clearances prior to proceeding to surgery    Her post-op meds have been sent to her pharmacy     Secondary Diagnoses:     DVT / Pulmonary Embolus Risk - The patient is at a higher risk for post operative DVT / pulmonary embolus secondary to their morbid obese status, relative sedentary lifestyle, and impending general anesthetic. We will plan to use anticoagulation therapy pre and post operative as well as  pneumatic compression devices and encourage ambulation once on the hospital nursing floor. The need for possible at home anticoagulation therapy has also been discussed and any decision on this matter will be made during post operative evaluations. The patient understands that their efforts at ambulation are of vital importance to reduce the risk of this complication thus placing significant burden on them as to the prevention of such issues.  Signs and symptoms of DVT / PE have been discussed with the patient and they have been instructed to call the office if any these occur in the \"at home\" post op phase    Signed By: Jearldine Dandy, MD     January 11, 2021

## 2021-01-11 NOTE — TELEPHONE ENCOUNTER
Pre-op resources sent via e-mail; awaiting a return call to determine risk factors for Lovenox quantity.

## 2021-01-13 ENCOUNTER — HOSPITAL ENCOUNTER (OUTPATIENT)
Dept: PREADMISSION TESTING | Age: 31
Discharge: HOME | End: 2021-01-13
Payer: COMMERCIAL

## 2021-01-13 DIAGNOSIS — E66.01 MORBID OBESITY (HCC): ICD-10-CM

## 2021-01-13 DIAGNOSIS — Z01.812 BLOOD TESTS PRIOR TO TREATMENT OR PROCEDURE: ICD-10-CM

## 2021-01-13 LAB
ABO + RH BLD: NORMAL
ALBUMIN SERPL-MCNC: 3.9 G/DL (ref 3.4–5)
ALBUMIN/GLOB SERPL: 1 {RATIO} (ref 0.8–1.7)
ALP SERPL-CCNC: 73 U/L (ref 45–117)
ALT SERPL-CCNC: 18 U/L (ref 13–56)
ANION GAP SERPL CALC-SCNC: 3 MMOL/L (ref 3–18)
AST SERPL-CCNC: 13 U/L (ref 10–38)
ATRIAL RATE: 85 BPM
BASOPHILS # BLD: 0 K/UL (ref 0–0.1)
BASOPHILS NFR BLD: 1 % (ref 0–2)
BILIRUB SERPL-MCNC: 0.3 MG/DL (ref 0.2–1)
BLOOD GROUP ANTIBODIES SERPL: NORMAL
BUN SERPL-MCNC: 12 MG/DL (ref 7–18)
BUN/CREAT SERPL: 15 (ref 12–20)
CALCIUM SERPL-MCNC: 9 MG/DL (ref 8.5–10.1)
CALCULATED P AXIS, ECG09: 51 DEGREES
CALCULATED R AXIS, ECG10: 43 DEGREES
CALCULATED T AXIS, ECG11: 33 DEGREES
CHLORIDE SERPL-SCNC: 106 MMOL/L (ref 100–111)
CO2 SERPL-SCNC: 32 MMOL/L (ref 21–32)
CREAT SERPL-MCNC: 0.78 MG/DL (ref 0.6–1.3)
DIAGNOSIS, 93000: NORMAL
DIFFERENTIAL METHOD BLD: ABNORMAL
EOSINOPHIL # BLD: 0.1 K/UL (ref 0–0.4)
EOSINOPHIL NFR BLD: 2 % (ref 0–5)
ERYTHROCYTE [DISTWIDTH] IN BLOOD BY AUTOMATED COUNT: 12.7 % (ref 11.6–14.5)
GLOBULIN SER CALC-MCNC: 3.8 G/DL (ref 2–4)
GLUCOSE SERPL-MCNC: 82 MG/DL (ref 74–99)
HCG SERPL QL: NEGATIVE
HCT VFR BLD AUTO: 40.9 % (ref 35–45)
HGB BLD-MCNC: 13 G/DL (ref 12–16)
LYMPHOCYTES # BLD: 3.3 K/UL (ref 0.9–3.6)
LYMPHOCYTES NFR BLD: 43 % (ref 21–52)
MCH RBC QN AUTO: 30.6 PG (ref 24–34)
MCHC RBC AUTO-ENTMCNC: 31.8 G/DL (ref 31–37)
MCV RBC AUTO: 96.2 FL (ref 74–97)
MONOCYTES # BLD: 0.4 K/UL (ref 0.05–1.2)
MONOCYTES NFR BLD: 6 % (ref 3–10)
NEUTS SEG # BLD: 3.8 K/UL (ref 1.8–8)
NEUTS SEG NFR BLD: 48 % (ref 40–73)
P-R INTERVAL, ECG05: 142 MS
PLATELET # BLD AUTO: 228 K/UL (ref 135–420)
PMV BLD AUTO: 12.8 FL (ref 9.2–11.8)
POTASSIUM SERPL-SCNC: 4.1 MMOL/L (ref 3.5–5.5)
PROT SERPL-MCNC: 7.7 G/DL (ref 6.4–8.2)
Q-T INTERVAL, ECG07: 366 MS
QRS DURATION, ECG06: 70 MS
QTC CALCULATION (BEZET), ECG08: 435 MS
RBC # BLD AUTO: 4.25 M/UL (ref 4.2–5.3)
SODIUM SERPL-SCNC: 141 MMOL/L (ref 136–145)
SPECIMEN EXP DATE BLD: NORMAL
VENTRICULAR RATE, ECG03: 85 BPM
WBC # BLD AUTO: 7.7 K/UL (ref 4.6–13.2)

## 2021-01-13 PROCEDURE — 80053 COMPREHEN METABOLIC PANEL: CPT

## 2021-01-13 PROCEDURE — 36415 COLL VENOUS BLD VENIPUNCTURE: CPT

## 2021-01-13 PROCEDURE — 86901 BLOOD TYPING SEROLOGIC RH(D): CPT

## 2021-01-13 PROCEDURE — 93005 ELECTROCARDIOGRAM TRACING: CPT

## 2021-01-13 PROCEDURE — 87635 SARS-COV-2 COVID-19 AMP PRB: CPT

## 2021-01-13 PROCEDURE — 85025 COMPLETE CBC W/AUTO DIFF WBC: CPT

## 2021-01-13 PROCEDURE — 84703 CHORIONIC GONADOTROPIN ASSAY: CPT

## 2021-01-14 LAB — SARS-COV-2, COV2NT: NOT DETECTED

## 2021-01-18 ENCOUNTER — TELEPHONE (OUTPATIENT)
Dept: SURGERY | Age: 31
End: 2021-01-18

## 2021-01-18 ENCOUNTER — ANESTHESIA EVENT (OUTPATIENT)
Dept: SURGERY | Age: 31
DRG: 621 | End: 2021-01-18
Payer: COMMERCIAL

## 2021-01-18 DIAGNOSIS — Z98.84 STATUS POST LAPAROSCOPIC SLEEVE GASTRECTOMY: Primary | ICD-10-CM

## 2021-01-19 ENCOUNTER — HOSPITAL ENCOUNTER (INPATIENT)
Age: 31
LOS: 1 days | Discharge: HOME | DRG: 621 | End: 2021-01-20
Attending: SPECIALIST | Admitting: SPECIALIST
Payer: COMMERCIAL

## 2021-01-19 ENCOUNTER — ANESTHESIA (OUTPATIENT)
Dept: SURGERY | Age: 31
DRG: 621 | End: 2021-01-19
Payer: COMMERCIAL

## 2021-01-19 DIAGNOSIS — K76.0 NAFLD (NONALCOHOLIC FATTY LIVER DISEASE): ICD-10-CM

## 2021-01-19 DIAGNOSIS — K21.9 GASTROESOPHAGEAL REFLUX DISEASE, UNSPECIFIED WHETHER ESOPHAGITIS PRESENT: ICD-10-CM

## 2021-01-19 DIAGNOSIS — E66.01 MORBID OBESITY WITH BODY MASS INDEX (BMI) OF 40.0 TO 49.9 (HCC): ICD-10-CM

## 2021-01-19 PROBLEM — Z98.890 POST-OPERATIVE STATE: Status: ACTIVE | Noted: 2021-01-19

## 2021-01-19 LAB — HCG UR QL: NEGATIVE

## 2021-01-19 PROCEDURE — 88305 TISSUE EXAM BY PATHOLOGIST: CPT

## 2021-01-19 PROCEDURE — 43239 EGD BIOPSY SINGLE/MULTIPLE: CPT | Performed by: SPECIALIST

## 2021-01-19 PROCEDURE — 77030009968 HC RELD STPLR ENDOSC J&J -D: Performed by: SPECIALIST

## 2021-01-19 PROCEDURE — 74011000250 HC RX REV CODE- 250: Performed by: SPECIALIST

## 2021-01-19 PROCEDURE — 77010033678 HC OXYGEN DAILY

## 2021-01-19 PROCEDURE — 0DB64Z3 EXCISION OF STOMACH, PERCUTANEOUS ENDOSCOPIC APPROACH, VERTICAL: ICD-10-PCS | Performed by: SPECIALIST

## 2021-01-19 PROCEDURE — 77030016151 HC PROTCTR LNS DFOG COVD -B: Performed by: SPECIALIST

## 2021-01-19 PROCEDURE — 77030034029 HC SLV GASTRCTMY CAL SYS DISP BOEH -C: Performed by: SPECIALIST

## 2021-01-19 PROCEDURE — 0FB24ZX EXCISION OF LEFT LOBE LIVER, PERCUTANEOUS ENDOSCOPIC APPROACH, DIAGNOSTIC: ICD-10-PCS | Performed by: SPECIALIST

## 2021-01-19 PROCEDURE — 77030041460 HC APL VISTASEAL J&J -B: Performed by: SPECIALIST

## 2021-01-19 PROCEDURE — 77030006643: Performed by: ANESTHESIOLOGY

## 2021-01-19 PROCEDURE — 81025 URINE PREGNANCY TEST: CPT

## 2021-01-19 PROCEDURE — 77030008518 HC TBNG INSUF ENDO STRY -B: Performed by: SPECIALIST

## 2021-01-19 PROCEDURE — 74011250636 HC RX REV CODE- 250/636: Performed by: SPECIALIST

## 2021-01-19 PROCEDURE — 43775 LAP SLEEVE GASTRECTOMY: CPT | Performed by: SPECIALIST

## 2021-01-19 PROCEDURE — 77030008477 HC STYL SATN SLP COVD -A: Performed by: ANESTHESIOLOGY

## 2021-01-19 PROCEDURE — 77030020782 HC GWN BAIR PAWS FLX 3M -B: Performed by: SPECIALIST

## 2021-01-19 PROCEDURE — 77030020269 HC MISC IMPL: Performed by: SPECIALIST

## 2021-01-19 PROCEDURE — 88313 SPECIAL STAINS GROUP 2: CPT

## 2021-01-19 PROCEDURE — 77030003580 HC NDL INSUF VERES J&J -B: Performed by: SPECIALIST

## 2021-01-19 PROCEDURE — 2709999900 HC NON-CHARGEABLE SUPPLY: Performed by: SPECIALIST

## 2021-01-19 PROCEDURE — 77030014008 HC SPNG HEMSTAT J&J -C: Performed by: SPECIALIST

## 2021-01-19 PROCEDURE — 47379 UNLISTED LAPS PX LIVER: CPT | Performed by: SPECIALIST

## 2021-01-19 PROCEDURE — 76210000061 HC REC RM PH II EA ADDL 0.5 >10HR: Performed by: SPECIALIST

## 2021-01-19 PROCEDURE — 77030002966 HC SUT PDS J&J -A: Performed by: SPECIALIST

## 2021-01-19 PROCEDURE — 77030002912 HC SUT ETHBND J&J -A: Performed by: SPECIALIST

## 2021-01-19 PROCEDURE — 74011250636 HC RX REV CODE- 250/636: Performed by: ANESTHESIOLOGY

## 2021-01-19 PROCEDURE — 88307 TISSUE EXAM BY PATHOLOGIST: CPT

## 2021-01-19 PROCEDURE — 77030020407 HC IV BLD WRMR ST 3M -A: Performed by: ANESTHESIOLOGY

## 2021-01-19 PROCEDURE — 77030010030: Performed by: SPECIALIST

## 2021-01-19 PROCEDURE — 77030040361 HC SLV COMPR DVT MDII -B: Performed by: SPECIALIST

## 2021-01-19 PROCEDURE — 77030010515 HC APPL ENDOCLP LIG J&J -B: Performed by: SPECIALIST

## 2021-01-19 PROCEDURE — 76210000030 HC REC RM PH II 5.5 TO 6 HR: Performed by: SPECIALIST

## 2021-01-19 PROCEDURE — 77030002933 HC SUT MCRYL J&J -A: Performed by: SPECIALIST

## 2021-01-19 PROCEDURE — 77030033200 HC PRT CLSR CRTR THOMP COOP -C: Performed by: SPECIALIST

## 2021-01-19 PROCEDURE — 76060000033 HC ANESTHESIA 1 TO 1.5 HR: Performed by: SPECIALIST

## 2021-01-19 PROCEDURE — 77030008603 HC TRCR ENDOSC EPATH J&J -C: Performed by: SPECIALIST

## 2021-01-19 PROCEDURE — 77030002916 HC SUT ETHLN J&J -A: Performed by: SPECIALIST

## 2021-01-19 PROCEDURE — 77030041458 HC SEALNT FIBRN VISTASEAL J&J -G: Performed by: SPECIALIST

## 2021-01-19 PROCEDURE — 74011000250 HC RX REV CODE- 250: Performed by: ANESTHESIOLOGY

## 2021-01-19 PROCEDURE — 77030033271 HC TRCR ENDOSC EPATH2 J&J -B: Performed by: SPECIALIST

## 2021-01-19 PROCEDURE — 76210000016 HC OR PH I REC 1 TO 1.5 HR: Performed by: SPECIALIST

## 2021-01-19 PROCEDURE — 77030008683 HC TU ET CUF COVD -A: Performed by: ANESTHESIOLOGY

## 2021-01-19 PROCEDURE — 76010000149 HC OR TIME 1 TO 1.5 HR: Performed by: SPECIALIST

## 2021-01-19 PROCEDURE — 77030040506 HC DRN WND MDII -A: Performed by: SPECIALIST

## 2021-01-19 PROCEDURE — 77030027876 HC STPLR ENDOSC FLX PWR J&J -G1: Performed by: SPECIALIST

## 2021-01-19 PROCEDURE — 65270000029 HC RM PRIVATE

## 2021-01-19 PROCEDURE — 0DJ08ZZ INSPECTION OF UPPER INTESTINAL TRACT, VIA NATURAL OR ARTIFICIAL OPENING ENDOSCOPIC: ICD-10-PCS | Performed by: SPECIALIST

## 2021-01-19 PROCEDURE — 77030034154 HC SHR COAG HARM ACE J&J -F: Performed by: SPECIALIST

## 2021-01-19 PROCEDURE — 74011250637 HC RX REV CODE- 250/637: Performed by: SPECIALIST

## 2021-01-19 PROCEDURE — 77030040361 HC SLV COMPR DVT MDII -B

## 2021-01-19 PROCEDURE — 77030009426 HC FCPS BIOP ENDOSC BSC -B: Performed by: SPECIALIST

## 2021-01-19 PROCEDURE — 77030008574 HC TBNG SUC IRR STRY -B: Performed by: SPECIALIST

## 2021-01-19 PROCEDURE — 77030008602 HC TRCR ENDOSC EPATH J&J -B: Performed by: SPECIALIST

## 2021-01-19 RX ORDER — CEFAZOLIN SODIUM/WATER 2 G/20 ML
2 SYRINGE (ML) INTRAVENOUS ONCE
Status: COMPLETED | OUTPATIENT
Start: 2021-01-19 | End: 2021-01-19

## 2021-01-19 RX ORDER — HYDROMORPHONE HYDROCHLORIDE 2 MG/ML
INJECTION, SOLUTION INTRAMUSCULAR; INTRAVENOUS; SUBCUTANEOUS AS NEEDED
Status: DISCONTINUED | OUTPATIENT
Start: 2021-01-19 | End: 2021-01-19 | Stop reason: HOSPADM

## 2021-01-19 RX ORDER — BUPIVACAINE HYDROCHLORIDE AND EPINEPHRINE 5; 5 MG/ML; UG/ML
INJECTION, SOLUTION EPIDURAL; INTRACAUDAL; PERINEURAL AS NEEDED
Status: DISCONTINUED | OUTPATIENT
Start: 2021-01-19 | End: 2021-01-19 | Stop reason: HOSPADM

## 2021-01-19 RX ORDER — KETOROLAC TROMETHAMINE 30 MG/ML
30 INJECTION, SOLUTION INTRAMUSCULAR; INTRAVENOUS EVERY 6 HOURS
Status: DISCONTINUED | OUTPATIENT
Start: 2021-01-19 | End: 2021-01-20 | Stop reason: HOSPADM

## 2021-01-19 RX ORDER — MIDAZOLAM HYDROCHLORIDE 1 MG/ML
INJECTION, SOLUTION INTRAMUSCULAR; INTRAVENOUS AS NEEDED
Status: DISCONTINUED | OUTPATIENT
Start: 2021-01-19 | End: 2021-01-19 | Stop reason: HOSPADM

## 2021-01-19 RX ORDER — NYSTATIN 100000 [USP'U]/ML
500000 SUSPENSION ORAL
Status: COMPLETED | OUTPATIENT
Start: 2021-01-19 | End: 2021-01-19

## 2021-01-19 RX ORDER — NALOXONE HYDROCHLORIDE 0.4 MG/ML
0.1 INJECTION, SOLUTION INTRAMUSCULAR; INTRAVENOUS; SUBCUTANEOUS AS NEEDED
Status: DISCONTINUED | OUTPATIENT
Start: 2021-01-19 | End: 2021-01-20 | Stop reason: HOSPADM

## 2021-01-19 RX ORDER — ENOXAPARIN SODIUM 100 MG/ML
40 INJECTION SUBCUTANEOUS ONCE
Status: COMPLETED | OUTPATIENT
Start: 2021-01-19 | End: 2021-01-19

## 2021-01-19 RX ORDER — SODIUM CHLORIDE, SODIUM LACTATE, POTASSIUM CHLORIDE, CALCIUM CHLORIDE 600; 310; 30; 20 MG/100ML; MG/100ML; MG/100ML; MG/100ML
125 INJECTION, SOLUTION INTRAVENOUS CONTINUOUS
Status: DISCONTINUED | OUTPATIENT
Start: 2021-01-19 | End: 2021-01-19

## 2021-01-19 RX ORDER — FENTANYL CITRATE 50 UG/ML
INJECTION, SOLUTION INTRAMUSCULAR; INTRAVENOUS AS NEEDED
Status: DISCONTINUED | OUTPATIENT
Start: 2021-01-19 | End: 2021-01-19 | Stop reason: HOSPADM

## 2021-01-19 RX ORDER — METOCLOPRAMIDE HYDROCHLORIDE 5 MG/ML
10 INJECTION INTRAMUSCULAR; INTRAVENOUS ONCE
Status: COMPLETED | OUTPATIENT
Start: 2021-01-19 | End: 2021-01-19

## 2021-01-19 RX ORDER — METOCLOPRAMIDE HYDROCHLORIDE 5 MG/ML
INJECTION INTRAMUSCULAR; INTRAVENOUS AS NEEDED
Status: DISCONTINUED | OUTPATIENT
Start: 2021-01-19 | End: 2021-01-19 | Stop reason: HOSPADM

## 2021-01-19 RX ORDER — SUCCINYLCHOLINE CHLORIDE 100 MG/5ML
SYRINGE (ML) INTRAVENOUS AS NEEDED
Status: DISCONTINUED | OUTPATIENT
Start: 2021-01-19 | End: 2021-01-19 | Stop reason: HOSPADM

## 2021-01-19 RX ORDER — FLUMAZENIL 0.1 MG/ML
0.2 INJECTION INTRAVENOUS
Status: DISCONTINUED | OUTPATIENT
Start: 2021-01-19 | End: 2021-01-20 | Stop reason: HOSPADM

## 2021-01-19 RX ORDER — ONDANSETRON 2 MG/ML
INJECTION INTRAMUSCULAR; INTRAVENOUS AS NEEDED
Status: DISCONTINUED | OUTPATIENT
Start: 2021-01-19 | End: 2021-01-19 | Stop reason: HOSPADM

## 2021-01-19 RX ORDER — FAMOTIDINE 20 MG/50ML
20 INJECTION, SOLUTION INTRAVENOUS ONCE
Status: DISCONTINUED | OUTPATIENT
Start: 2021-01-19 | End: 2021-01-19 | Stop reason: CLARIF

## 2021-01-19 RX ORDER — SODIUM CHLORIDE 0.9 % (FLUSH) 0.9 %
5-40 SYRINGE (ML) INJECTION EVERY 8 HOURS
Status: DISCONTINUED | OUTPATIENT
Start: 2021-01-19 | End: 2021-01-20 | Stop reason: HOSPADM

## 2021-01-19 RX ORDER — PROPOFOL 10 MG/ML
INJECTION, EMULSION INTRAVENOUS AS NEEDED
Status: DISCONTINUED | OUTPATIENT
Start: 2021-01-19 | End: 2021-01-19 | Stop reason: HOSPADM

## 2021-01-19 RX ORDER — MORPHINE SULFATE 10 MG/ML
6 INJECTION, SOLUTION INTRAMUSCULAR; INTRAVENOUS
Status: DISCONTINUED | OUTPATIENT
Start: 2021-01-19 | End: 2021-01-20

## 2021-01-19 RX ORDER — SODIUM CHLORIDE 0.9 % (FLUSH) 0.9 %
5-40 SYRINGE (ML) INJECTION AS NEEDED
Status: DISCONTINUED | OUTPATIENT
Start: 2021-01-19 | End: 2021-01-20 | Stop reason: HOSPADM

## 2021-01-19 RX ORDER — HYDROMORPHONE HYDROCHLORIDE 1 MG/ML
1 INJECTION, SOLUTION INTRAMUSCULAR; INTRAVENOUS; SUBCUTANEOUS
Status: DISCONTINUED | OUTPATIENT
Start: 2021-01-19 | End: 2021-01-19

## 2021-01-19 RX ORDER — ENOXAPARIN SODIUM 100 MG/ML
40 INJECTION SUBCUTANEOUS EVERY 12 HOURS
Status: DISCONTINUED | OUTPATIENT
Start: 2021-01-19 | End: 2021-01-20 | Stop reason: HOSPADM

## 2021-01-19 RX ORDER — ROCURONIUM BROMIDE 10 MG/ML
INJECTION, SOLUTION INTRAVENOUS AS NEEDED
Status: DISCONTINUED | OUTPATIENT
Start: 2021-01-19 | End: 2021-01-19 | Stop reason: HOSPADM

## 2021-01-19 RX ORDER — MAGNESIUM SULFATE 100 %
4 CRYSTALS MISCELLANEOUS AS NEEDED
Status: DISCONTINUED | OUTPATIENT
Start: 2021-01-19 | End: 2021-01-20 | Stop reason: HOSPADM

## 2021-01-19 RX ORDER — ONDANSETRON 2 MG/ML
4 INJECTION INTRAMUSCULAR; INTRAVENOUS
Status: DISCONTINUED | OUTPATIENT
Start: 2021-01-19 | End: 2021-01-20 | Stop reason: HOSPADM

## 2021-01-19 RX ORDER — SODIUM CHLORIDE, SODIUM LACTATE, POTASSIUM CHLORIDE, CALCIUM CHLORIDE 600; 310; 30; 20 MG/100ML; MG/100ML; MG/100ML; MG/100ML
150 INJECTION, SOLUTION INTRAVENOUS CONTINUOUS
Status: DISCONTINUED | OUTPATIENT
Start: 2021-01-19 | End: 2021-01-20 | Stop reason: HOSPADM

## 2021-01-19 RX ORDER — FENTANYL CITRATE 50 UG/ML
25 INJECTION, SOLUTION INTRAMUSCULAR; INTRAVENOUS AS NEEDED
Status: DISCONTINUED | OUTPATIENT
Start: 2021-01-19 | End: 2021-01-20 | Stop reason: HOSPADM

## 2021-01-19 RX ORDER — HYDROMORPHONE HYDROCHLORIDE 1 MG/ML
0.5 INJECTION, SOLUTION INTRAMUSCULAR; INTRAVENOUS; SUBCUTANEOUS
Status: DISCONTINUED | OUTPATIENT
Start: 2021-01-19 | End: 2021-01-19

## 2021-01-19 RX ORDER — SODIUM CHLORIDE, SODIUM LACTATE, POTASSIUM CHLORIDE, CALCIUM CHLORIDE 600; 310; 30; 20 MG/100ML; MG/100ML; MG/100ML; MG/100ML
1000 INJECTION, SOLUTION INTRAVENOUS CONTINUOUS
Status: DISCONTINUED | OUTPATIENT
Start: 2021-01-19 | End: 2021-01-20 | Stop reason: HOSPADM

## 2021-01-19 RX ORDER — ACETAMINOPHEN 500 MG
1000 TABLET ORAL ONCE
Status: COMPLETED | OUTPATIENT
Start: 2021-01-19 | End: 2021-01-19

## 2021-01-19 RX ORDER — LIDOCAINE HYDROCHLORIDE 20 MG/ML
INJECTION, SOLUTION EPIDURAL; INFILTRATION; INTRACAUDAL; PERINEURAL AS NEEDED
Status: DISCONTINUED | OUTPATIENT
Start: 2021-01-19 | End: 2021-01-19 | Stop reason: HOSPADM

## 2021-01-19 RX ORDER — HYDROMORPHONE HYDROCHLORIDE 2 MG/1
2 TABLET ORAL
Status: DISCONTINUED | OUTPATIENT
Start: 2021-01-19 | End: 2021-01-19

## 2021-01-19 RX ADMIN — Medication 2 G: at 08:05

## 2021-01-19 RX ADMIN — SODIUM CHLORIDE, SODIUM LACTATE, POTASSIUM CHLORIDE, AND CALCIUM CHLORIDE 150 ML/HR: 600; 310; 30; 20 INJECTION, SOLUTION INTRAVENOUS at 18:07

## 2021-01-19 RX ADMIN — MIDAZOLAM 2 MG: 1 INJECTION INTRAMUSCULAR; INTRAVENOUS at 07:49

## 2021-01-19 RX ADMIN — ONDANSETRON 4 MG: 2 INJECTION INTRAMUSCULAR; INTRAVENOUS at 19:27

## 2021-01-19 RX ADMIN — PROMETHAZINE HYDROCHLORIDE 12.5 MG: 25 INJECTION INTRAMUSCULAR; INTRAVENOUS at 16:21

## 2021-01-19 RX ADMIN — HYDROMORPHONE HYDROCHLORIDE 0.25 MG: 2 INJECTION, SOLUTION INTRAMUSCULAR; INTRAVENOUS; SUBCUTANEOUS at 08:56

## 2021-01-19 RX ADMIN — PROPOFOL 200 MG: 10 INJECTION, EMULSION INTRAVENOUS at 07:59

## 2021-01-19 RX ADMIN — ONDANSETRON HYDROCHLORIDE 4 MG: 2 INJECTION INTRAMUSCULAR; INTRAVENOUS at 08:21

## 2021-01-19 RX ADMIN — KETOROLAC TROMETHAMINE 30 MG: 30 INJECTION, SOLUTION INTRAMUSCULAR at 11:47

## 2021-01-19 RX ADMIN — SODIUM CHLORIDE, SODIUM LACTATE, POTASSIUM CHLORIDE, AND CALCIUM CHLORIDE: 600; 310; 30; 20 INJECTION, SOLUTION INTRAVENOUS at 08:17

## 2021-01-19 RX ADMIN — FENTANYL CITRATE 50 MCG: 50 INJECTION, SOLUTION INTRAMUSCULAR; INTRAVENOUS at 07:59

## 2021-01-19 RX ADMIN — ENOXAPARIN SODIUM 40 MG: 40 INJECTION SUBCUTANEOUS at 19:17

## 2021-01-19 RX ADMIN — ONDANSETRON 4 MG: 2 INJECTION INTRAMUSCULAR; INTRAVENOUS at 11:53

## 2021-01-19 RX ADMIN — Medication 10 ML: at 18:08

## 2021-01-19 RX ADMIN — FENTANYL CITRATE 50 MCG: 50 INJECTION, SOLUTION INTRAMUSCULAR; INTRAVENOUS at 07:49

## 2021-01-19 RX ADMIN — SUGAMMADEX 234 MG: 100 INJECTION, SOLUTION INTRAVENOUS at 09:03

## 2021-01-19 RX ADMIN — ROCURONIUM BROMIDE 30 MG: 10 INJECTION, SOLUTION INTRAVENOUS at 08:11

## 2021-01-19 RX ADMIN — ACETAMINOPHEN 1000 MG: 500 TABLET ORAL at 06:52

## 2021-01-19 RX ADMIN — NYSTATIN 500000 UNITS: 100000 SUSPENSION ORAL at 06:55

## 2021-01-19 RX ADMIN — SODIUM CHLORIDE, SODIUM LACTATE, POTASSIUM CHLORIDE, AND CALCIUM CHLORIDE 125 ML/HR: 600; 310; 30; 20 INJECTION, SOLUTION INTRAVENOUS at 06:50

## 2021-01-19 RX ADMIN — KETOROLAC TROMETHAMINE 30 MG: 30 INJECTION, SOLUTION INTRAMUSCULAR at 18:07

## 2021-01-19 RX ADMIN — ENOXAPARIN SODIUM 40 MG: 40 INJECTION SUBCUTANEOUS at 06:50

## 2021-01-19 RX ADMIN — METOCLOPRAMIDE 10 MG: 5 INJECTION, SOLUTION INTRAMUSCULAR; INTRAVENOUS at 14:28

## 2021-01-19 RX ADMIN — HYDROMORPHONE HYDROCHLORIDE 0.5 MG: 2 INJECTION, SOLUTION INTRAMUSCULAR; INTRAVENOUS; SUBCUTANEOUS at 08:19

## 2021-01-19 RX ADMIN — METOCLOPRAMIDE 10 MG: 5 INJECTION, SOLUTION INTRAMUSCULAR; INTRAVENOUS at 08:55

## 2021-01-19 RX ADMIN — LIDOCAINE HYDROCHLORIDE 80 MG: 20 INJECTION, SOLUTION EPIDURAL; INFILTRATION; INTRACAUDAL; PERINEURAL at 07:59

## 2021-01-19 RX ADMIN — SODIUM CHLORIDE, SODIUM LACTATE, POTASSIUM CHLORIDE, AND CALCIUM CHLORIDE: 600; 310; 30; 20 INJECTION, SOLUTION INTRAVENOUS at 09:15

## 2021-01-19 RX ADMIN — Medication 140 MG: at 07:59

## 2021-01-19 RX ADMIN — ROCURONIUM BROMIDE 10 MG: 10 INJECTION, SOLUTION INTRAVENOUS at 07:58

## 2021-01-19 RX ADMIN — HYDROMORPHONE HYDROCHLORIDE 0.25 MG: 2 INJECTION, SOLUTION INTRAMUSCULAR; INTRAVENOUS; SUBCUTANEOUS at 08:42

## 2021-01-19 RX ADMIN — SODIUM CHLORIDE, SODIUM LACTATE, POTASSIUM CHLORIDE, AND CALCIUM CHLORIDE 125 ML/HR: 600; 310; 30; 20 INJECTION, SOLUTION INTRAVENOUS at 11:50

## 2021-01-19 RX ADMIN — FAMOTIDINE 20 MG: 10 INJECTION INTRAVENOUS at 06:53

## 2021-01-19 NOTE — PERIOP NOTES
TRANSFER - OUT REPORT:    Verbal report given to Jolene RN(name) on Amaury Like  being transferred to [facility](unit) for routine post - op       Report consisted of patients Situation, Background, Assessment and   Recommendations(SBAR). Information from the following report(s) SBAR, Kardex, Procedure Summary, Intake/Output, MAR, Recent Results and Cardiac Rhythm NSR was reviewed with the receiving nurse. Lines:   Peripheral IV 01/19/21 Right Forearm (Active)   Site Assessment Clean, dry, & intact 01/19/21 1135   Phlebitis Assessment 0 01/19/21 1135   Infiltration Assessment 0 01/19/21 1135   Dressing Status Clean, dry, & intact 01/19/21 1135   Dressing Type Transparent;Tape 01/19/21 1135   Hub Color/Line Status Infusing 01/19/21 1135        Opportunity for questions and clarification was provided.       Patient transported with:   Registered Nurse  Tech

## 2021-01-19 NOTE — OP NOTES
OPERATIVE REPORT         Patient:Rui Oliveros [facility]   : 1990  Medical Record UUMVXE:[id number]    Pre-operative Diagnosis:  MORBID OBESITY, BMI 44  Post-operative Diagnosis: MORBID OBESITY, BMI 44  Procedure: Procedure(s):  LAPAROSCOPIC GASTRIC SLEEVE  WEDGE LIVER BIOPSY  INTRAOPERATIVE ENDOSCOPY WITH BIOPSY  Location: [facility]  Surgeon: Berkley Drew MD  Assistant:  [facility]  - (there are no qualified interns, residents, or any other qualified house   physicians available to assist with this surgery) performed retraction of various structures,  assisted in   creation of the gastric sleeve, fired stapling devices, obtained hemostasis along staple lines via hemoclips,       Anesthesia: General       Specimens: 1. Gastric Sleeve Resection                       2. Liver Wedge Biopsy                       3. Endoscopy biopsy    EBL: less than 5cc  Additional Findings: None  Complications: None             STATEMENT OF MEDICAL NECESSITY: The patient is a 26y.o.-year-old female who has   had a history of obesity. she has failed conservative weight loss measures,   such began to consider weight loss surgical options. she chose the   sleeve gastrectomy as a means of surgical weight control. she has undergone   nutritional and psychological teaching at this time period and does wish to proceed   with sleeve gastrectomy. OPERATIVE PROCEDURE: The patient was brought to the operating room, placed   on the table in supine position at which time general  anesthesia was administered   without any difficulty. The abdomen was then prepped and draped in the   usual sterile fashion. Using a 15 blade, a 1 cm incision was made just to the   left of the umbilicus. The veress needle approach was used to gain access to   the peritoneal cavity which was then insufflated.  The Visi-Port was then placed   at that site,then 4 additional trocars were placed in the usual U-shaped   configuration with a subxiphoid incision being made to accommodate the   [facility] retractor. On entering the abdomen, the patient was noted to have a   moderately fatty liver with possible evidence of early steatohepatitis. I elevated the liver and noted the   patient had no diaphragmatic hernia present. I began the operation by choosing an area 2-3 cm   proximal to the pylorus and within the gastroepiploic vessels I began to divide off these vessels individually. I moved cephalad toward short gastric vessels, which were very difficult to take down due to the proximity   to the splenic hilum. I was able to do so, clearing the entire left crural area. I then placed a Visigi tube,   impacting at the distal antrum. I then began the resection with the powered [place]   stapler using the green loads with Endopath buttress strips for the first firing tangential along the   antral region. The second and subsequent firings were used with blue  reloads and the same buttress strips reaching just past the incisura region. The remainder of the 4 vertical   firings completed the resection at the left crural region. I then tested   the pouch via the Visigi tube using dilute methylene blue,it was noted to be completely   Watertight. I then left the operative field and proceeded to the the head of the bed and performed an   intraoperative EGD. The scope was passed successfully into the gastric sleeve to the level of the pylorus. Biopsies were taken of this region and submitted to test both for H Pylori and for pathologic diagnosis. There was no bleeding noted and no leak appreciated with air insufflation. I then returned to the surgical field. I then obtained hemostasis along the staple line using   Hemoclips and sutures where needed.   I then used 3 separate 2-0 Ethibond sutures to secure the lateral   aspect of the newly created sleeve stomach to the resected edge of the gastrocolic omentum in an effort   to maintain the continuity of the sleeve and prevent twisting. I then used Eviseal   along the entire staple line to obtain hemostasis and then place Surgicel Snow over the staple line also. With all this having been completed, I then removed the liver retractor. I performed a liver wedge biopsy of   the left lobe of the liver due to its abnormality and submitted to pathology for permanent section. I then placed   a LAKISHA drain in the left upper quadrant region along the staple line. I removed the specimen from the operative   field via the LLQ incision. I closed the left lower quadrant trocar site using a transabdominal #0 PDS   suture along the fascia, and all skin incisions were then closed using 4-0 subcuticular Monocryl. Steri-Strips   and sterile dressings were applied. The patient tolerated the procedure well.        Rafa Madrigal M.D.

## 2021-01-19 NOTE — PERIOP NOTES
1191 [facility] reports Lisa Hauser wants to admit to 3S.    1545 - Patient moved back up to PACU, placed in an inpatient bed for comfort. 1600 - Pt resting comfortably in bed, eyes closed, even chest rise and fall. Vital signs WDL, no other episodes of vomiting at this time.

## 2021-01-19 NOTE — PROGRESS NOTES
18 Informed Dr. Lisa Hauser that pt has arrived to the floor. Asked if pt should continue Dilaudid tablet. He will review orders at this time. 1943 Verbal shift change report given to LOUIS Poole RN (oncoming nurse) by Nathanael Murphy RN  (offgoing nurse). Report included the following information SBAR and Kardex. Shift summary: No other events. Pt rested for this shift. LAKISHA bulb was drained of 20 ml brown drainage. No vomiting since arrival to the floor. Reports her nausea is a little bit better. Zofran 4 mg was given once since arriving to the floor. SCDs have been applied and lovenox has been given. She reports having ambulated twice today, and she was encouraged to ambulate again when nausea subsides.

## 2021-01-19 NOTE — ANESTHESIA PREPROCEDURE EVALUATION
Anesthetic History   No history of anesthetic complications            Review of Systems / Medical History  Patient summary reviewed, nursing notes reviewed and pertinent labs reviewed    Pulmonary  Within defined limits                 Neuro/Psych     seizures: well controlled        Comments: With eclampsia Cardiovascular  Within defined limits              Pertinent negatives: No hypertension  Exercise tolerance: >4 METS     GI/Hepatic/Renal  Within defined limits           Pertinent negatives: No GERD, hepatitis, liver disease and renal disease   Endo/Other        Morbid obesity  Pertinent negatives: No diabetes, hypothyroidism and hyperthyroidism   Other Findings              Physical Exam    Airway  Mallampati: III  TM Distance: 4 - 6 cm  Neck ROM: normal range of motion   Mouth opening: Normal     Cardiovascular  Regular rate and rhythm,  S1 and S2 normal,  no murmur, click, rub, or gallop             Dental  No notable dental hx       Pulmonary                 Abdominal  GI exam deferred       Other Findings            Anesthetic Plan    ASA: 3  Anesthesia type: general          Induction: Intravenous  Anesthetic plan and risks discussed with: Patient      Spoke to patient in preop about using other forms of contraception for one week post op due to reversal agent. none

## 2021-01-19 NOTE — PERIOP NOTES
Patient up with nursing assistant. Pt ambulates hallways w/ no assistance needed, steady gait noted. Vital signs WDL of Dr. Joe Keto specific orders. LAKISHA drain and abdominal incisions assessed, minimal drainage in LAKISHA drain and no drainage on surgical dressings. Patient reports mild discomfort to abdomen and nausea, will medicate per MAR orders. Patient placed back in recliner in resting position. IS done, pt needs encouragement with this. Will continue to monitor and assess.

## 2021-01-19 NOTE — NURSE NAVIGATOR
Patient sleeping in recliner upon this RN's visit. She awoke to voice. She ambulated down the hallway x2 and is reaching 750 mL on IS. Nausea and bloody emesis despite Zofran at 1200. Dr. Kary Velasquez made aware.

## 2021-01-19 NOTE — PERIOP NOTES
LISSAAR given report to [employer]. Santy Angela made aware that the pt will be transferring to PHASE 2.

## 2021-01-19 NOTE — PERIOP NOTES
Patient ambulated phase 2 hallways w/ this RN. Even steady gait noted. Patient needs continued encouragement with IS, denies pain/nausea at this time. LAKISHA drain and abdominal incisions assessed, no new drainage on bandages or in drain. VS remain WDL of Dr. Maris Aaron parameters. Will continue to monitor and assess.

## 2021-01-19 NOTE — NURSE NAVIGATOR
Patient resting in recliner in Phase 2. She is dozing in and out of sleep throughout visit. Patient complained of expected pain with mild nausea. Vitals:   Visit Vitals  BP (!) 152/84   Pulse 71   Temp 97.6 °F (36.4 °C)   Resp 20   Ht 5' 1\" (1.549 m)   Wt 117.1 kg (258 lb 1 oz)   SpO2 98%   BMI 48.76 kg/m²     General: Alert & oriented to person, place, time, and situation  Pulmonary: Lungs clear to auscultation in all fields. Cardiac: Regular rate and rhythm  Abdomen: Appropriate tenderness; soft; non-distended;   hypo-active bowel sounds  Lap sites: Within normal limits  LAKISHA drain: Small amount of serosanguinous, tan drainage    Plan:  -Continue medications for symptom management  -Encourage ambulation  -IS 10 times an hour  -NPO    Plan was discussed with patient, as well as IS teaching provided. Patient verbalized understanding to plan and education.

## 2021-01-19 NOTE — PERIOP NOTES
Patient attempted IS and had 2nd episode of vomiting. Emesis dark brown in color approx 50 cc's in emesis bag. Pt continues to complain of nausea and mild abdominal pain. Will make MD aware.

## 2021-01-19 NOTE — PERIOP NOTES
Patient assigned to room 317, Jolene RN will be accepting nurse, looking at SBAR now    Family updated on patient current status and room # at this time.

## 2021-01-19 NOTE — PERIOP NOTES
Attempted to provide update on patient current status to spouse via phone number provided, no answer, voicemail left at this time.

## 2021-01-19 NOTE — INTERVAL H&P NOTE
Update History & Physical 
 
The Patient's History and Physical of January 11, 2021 was reviewed with the patient and I examined the patient. There was no change. The surgical site was confirmed by the patient and me. Plan:  The risk, benefits, expected outcome, and alternative to the recommended procedure have been discussed with the patient. Patient understands and wants to proceed with the procedure. The patient was counseled at length about the risks of piter Covid-19 during their perioperative period and any recovery window from their procedure. The patient was made aware that piter Covid-19  may worsen their prognosis for recovering from their procedure and lend to a higher morbidity and/or mortality risk. All material risks, benefits, and reasonable alternatives including postponing the procedure were discussed. The patient does  wish to proceed with the procedure at this time.  
  
 
Electronically signed by Asif López MD on 1/19/2021 at 6:43 AM

## 2021-01-19 NOTE — ANESTHESIA POSTPROCEDURE EVALUATION
Procedure(s):  LAPAROSCOPIC GASTRIC SLEEVE, WEDGE LIVER BIOPSY AND INTRAOPERATIVE ENDOSCOPY. general    Anesthesia Post Evaluation        Comments: Post-Anesthesia Evaluation and Assessment    Cardiovascular Function/Vital Signs  BP (!) 145/82   Pulse 77   Temp 36.2 °C (97.1 °F)   Resp 23   Ht 5' 1\" (1.549 m)   Wt 117.1 kg (258 lb 1 oz)   SpO2 100%   BMI 48.76 kg/m²     Patient is status post Procedure(s):  LAPAROSCOPIC GASTRIC SLEEVE, WEDGE LIVER BIOPSY AND INTRAOPERATIVE ENDOSCOPY. Nausea/Vomiting: Controlled. Postoperative hydration reviewed and adequate. Pain:  Pain Scale 1: FLACC (01/19/21 0950)  Pain Intensity 1: 0 (01/19/21 0950)   Managed. Neurological Status:   Neuro (WDL): Exceptions to WDL (01/19/21 0950)   At baseline. Mental Status and Level of Consciousness: Arousable. Pulmonary Status:   O2 Device: Nasal cannula (01/19/21 0947)   Adequate oxygenation and airway patent. Complications related to anesthesia: None    Post-anesthesia assessment completed. No concerns. Patient has met all discharge requirements. Signed By: Chelo George MD    January 19, 2021                   INITIAL Post-op Vital signs:   Vitals Value Taken Time   /82 01/19/21 1010   Temp 36.2 °C (97.1 °F) 01/19/21 0920   Pulse 71 01/19/21 1015   Resp 25 01/19/21 1015   SpO2 99 % 01/19/21 1015   Vitals shown include unvalidated device data.

## 2021-01-19 NOTE — PERIOP NOTES
Patient noted to have 1 episode of bloody emesis with Dr. Daisy Rondon nurse. Orders received in [facility], will medicate accordingly and continue to monitor patients nausea.

## 2021-01-19 NOTE — PERIOP NOTES
Spoke w/ family and provided update to [place], family member who will be receiving discharge instructions and picking patient up.

## 2021-01-20 ENCOUNTER — HOSPITAL ENCOUNTER (OUTPATIENT)
Dept: GENERAL RADIOLOGY | Age: 31
Discharge: HOME | DRG: 621 | End: 2021-01-20
Attending: SPECIALIST
Payer: COMMERCIAL

## 2021-01-20 VITALS
SYSTOLIC BLOOD PRESSURE: 142 MMHG | OXYGEN SATURATION: 100 % | BODY MASS INDEX: 48.72 KG/M2 | WEIGHT: 258.06 LBS | TEMPERATURE: 99.2 F | HEART RATE: 67 BPM | DIASTOLIC BLOOD PRESSURE: 93 MMHG | HEIGHT: 61 IN | RESPIRATION RATE: 16 BRPM

## 2021-01-20 DIAGNOSIS — Z98.84 STATUS POST LAPAROSCOPIC SLEEVE GASTRECTOMY: ICD-10-CM

## 2021-01-20 PROCEDURE — 74240 X-RAY XM UPR GI TRC 1CNTRST: CPT

## 2021-01-20 PROCEDURE — 74011250636 HC RX REV CODE- 250/636: Performed by: SPECIALIST

## 2021-01-20 PROCEDURE — 74011000250 HC RX REV CODE- 250: Performed by: SPECIALIST

## 2021-01-20 PROCEDURE — 74011000636 HC RX REV CODE- 636: Performed by: SPECIALIST

## 2021-01-20 RX ORDER — OXYCODONE AND ACETAMINOPHEN 5; 325 MG/1; MG/1
1-2 TABLET ORAL
Status: DISCONTINUED | OUTPATIENT
Start: 2021-01-20 | End: 2021-01-20 | Stop reason: HOSPADM

## 2021-01-20 RX ADMIN — KETOROLAC TROMETHAMINE 30 MG: 30 INJECTION, SOLUTION INTRAMUSCULAR at 06:49

## 2021-01-20 RX ADMIN — PROMETHAZINE HYDROCHLORIDE 12.5 MG: 25 INJECTION INTRAMUSCULAR; INTRAVENOUS at 02:10

## 2021-01-20 RX ADMIN — SODIUM CHLORIDE, SODIUM LACTATE, POTASSIUM CHLORIDE, AND CALCIUM CHLORIDE 150 ML/HR: 600; 310; 30; 20 INJECTION, SOLUTION INTRAVENOUS at 02:01

## 2021-01-20 RX ADMIN — KETOROLAC TROMETHAMINE 30 MG: 30 INJECTION, SOLUTION INTRAMUSCULAR at 00:06

## 2021-01-20 RX ADMIN — IOHEXOL 25 ML: 240 INJECTION, SOLUTION INTRATHECAL; INTRAVASCULAR; INTRAVENOUS; ORAL at 08:16

## 2021-01-20 RX ADMIN — Medication 10 ML: at 00:05

## 2021-01-20 RX ADMIN — ONDANSETRON 4 MG: 2 INJECTION INTRAMUSCULAR; INTRAVENOUS at 10:24

## 2021-01-20 RX ADMIN — ENOXAPARIN SODIUM 40 MG: 40 INJECTION SUBCUTANEOUS at 10:33

## 2021-01-20 RX ADMIN — Medication 10 ML: at 06:50

## 2021-01-20 NOTE — PROGRESS NOTES
1377 Bedside and verbal shift change report given to Shahab Haro RN (on coming nurse) by Joaquin David RN (off going nurse). Report included the following information SBAR, Kardex, OR Summary, Intake/Output and MAR.    1225   AVS review with pt, opportunity for questions and concerns at this time. D/c IV clean and dry. Properly discarded armbands.

## 2021-01-20 NOTE — DISCHARGE INSTRUCTIONS
[facility] 1949 [facility]  Mayuri Austin. Maddie Pagan M.D., F.A.C.S.  [address] [place] [address], [address] [facility]  Office: [phone]    Fax:  [phone]    Discharge Instruction for Gastric Bypass / Sleeve Gastrectomy Patients    Diet:   Continue with the liquid diet until you are seen in the office. Make sure you sip fluids all day. Aim for  Gms of protein every day. Activity:   Walking is encouraged. Rest when you are tired.  You may shower.  You may climb stairs. Take your time.  No lifting more than 10-15 lbs.  If you feel discomfort during an activity, rest.   Do not drive for 1 week. Wound Care:   Clean incisions with soap and water when in the shower. Pat dry.  Leave steri-strips on until they fall off.  A small amount of drainage may be present from the incisions. Contact the office if you notice an increase in drainage, an odor, increased redness, or fever > 100.5. Medications:   You will receive a prescription for pain medication at the time of discharge.  For upset stomach you may take over the counter medications such as Maalox, Mylanta, Pepcid, Zantac, or Tagamet.  You may take Tylenol   Non-aspirin based arthritis medications may be resumed after the first month.  Take a childrens or adult chewable multivitamin.  Milk of Magnesia will help with constipation.  It is fine to take your usual home medications. Blood pressure medications should be continued after surgery. Diabetic medications can frequently be reduced very quickly after surgery. Diabetics should continue to monitor blood sugars frequently after surgery and contact the prescribing physician for any questions. Follow-Up Appointment:   If you do not already have a follow-up appointment scheduled, contact the office in the next few days to obtain one. It is usually scheduled for 10-14 days after you surgery date. Office phone number:  [phone].         REV  09/2010

## 2021-01-20 NOTE — PROGRESS NOTES
Bedside and verbal report given to LOUIS Siddiqui RN (oncoming nurse) by Panchito Saini RN  (off going nurse). Report included the following information SBAR, Kardex, OR Summary, Intake/Output, and MAR. PRN Phenergan given for nausea  Pt ambulated to the bathroom and hallway x1  No complaints of pain, no acute changes    Bedside and verbal report given by (off going nurse) LOUIS Siddiqui RN to (oncoming nurse) Elizabet Mo RN. Report included the following information SBAR, Kardex, OR Summary, Intake/Output, and MAR.

## 2021-01-20 NOTE — NURSE NAVIGATOR
Patient sitting on side of bed sipping protein drink and tolerating well. Vitals:   Blood pressure (!) 142/93, pulse 67, temperature 99.2 °F (37.3 °C), resp. rate 16, height 5' 1\" (1.549 m), weight 117.1 kg (258 lb 1 oz), SpO2 100 %, unknown if currently breastfeeding. Output: reviewed, and patient verified urinating clear, yellow urine. Pulmonary: Clear in all lobes  Cardiac: Regular rate and rhythm  Abdomen: Bowel sounds hypo-active x4. Lap sites without erythema, swelling,  and/or drainage. LAKISHA drain with a small amount of serosanguinous drainage. SCD's: Positioned and operating WNL    Patient with expected pain, but is being managed and is currently tolerable. No nausea and/or vomiting. Patient has been ambulating the halls. Patient has not had the opportunity to swallow pills. Post-op diet progression discussed with patient. Patient to be discharged on a bariatric full liquid diet. Patient verbalized understanding of liquid diet for next two weeks until first post-op visit. Goal of four ounces per hour with one ounce being protein was clearly understood. Medications were discussed (i.e., pain- Percocet, Tylenol, not to use aspirin or ibuprofen based products, as well as steroids). Constipation was discussed and ways to alleviate it were discussed. Education completed on IS use and to ambulate every hour when at home. Patient completed a return demonstration on IS with no issues or concerns from this RN. Lovenox filled and in the home. Lovenox education provided, and patient will be administering herself. Percocet script will be given to patient upon discharge. Patient has chewable multi-vitamin, probiotic, and Prilosec in the home; they were reviewed. Ketosis was also reviewed. Patient given a report card to record intake and a handout to support bedside education. All questions were answered by this RN, and patient verbalized understanding to all education provided.   Goals for discharge were discussed, and patient verbalized understanding. Post-op follow-up appt. kayode scheduled.

## 2021-01-20 NOTE — DISCHARGE SUMMARY
Discharge Summary    Patient: Annabel Acuña               Sex: female          DOA: 2021         YOB: 1990      Age:  26 y.o.        LOS:  LOS: 1 day                Admit Date: 2021    Discharge Date: 2021    Admission Diagnoses: Post-operative state [Z98.890]    Discharge Diagnoses:    Problem List as of 2021 Date Reviewed: 2021          Codes Class Noted - Resolved    Post-operative state ICD-10-CM: Z98.890  ICD-9-CM: V45.89  2021 - Present        Epilepsy ([facility] ICD-10-CM: G40.909  ICD-9-CM: 345.90  Unknown - Present        Gestational hypertension ICD-10-CM: O13.9  ICD-9-CM: 642.30  Unknown - Present        Morbid obesity ([facility] ICD-10-CM: E66.01  ICD-9-CM: 278.01  Unknown - Present        * (Principal) Morbid obesity with body mass index (BMI) of 40.0 to 49.9 (HCC) ICD-10-CM: E66.01  ICD-9-CM: 278.01  Unknown - Present        Uses birth control ICD-10-CM: Z78.9  ICD-9-CM: V49.89  Unknown - Present    Overview Signed 2021  1:35 PM by PAPO Garnett     IUD             History of bilateral breast reduction surgery (Chronic) ICD-10-CM: W05.529  ICD-9-CM: V45.89  2020 - Present        History of  premature rupture of membranes (PPROM) ICD-10-CM: Z87.59  ICD-9-CM: V13.29  2019 - Present        Seizures ([facility] ICD-10-CM: R56.9  ICD-9-CM: 780.39  2012 - Present    Overview Signed 2021  1:34 PM by PAPO Garnett     x 1 two weeks after child birth was on dilantin for 6 months             RESOLVED: Spontaneous vaginal delivery ICD-10-CM: O80  ICD-9-CM: 650  3/16/2020 - 2020        RESOLVED: Anemia associated with acute blood loss ICD-10-CM: D62  ICD-9-CM: 285.1  3/16/2020 - 2020        RESOLVED: IUP (intrauterine pregnancy), incidental ICD-10-CM: Z33.1  ICD-9-CM: V22.2  3/14/2020 - 3/16/2020        RESOLVED: 38 weeks gestation of pregnancy ICD-10-CM: Z3A.38  ICD-9-CM: V22.2  3/14/2020 - 3/16/2020        RESOLVED: Uterine contractions during pregnancy ICD-10-CM: O62.2  ICD-9-CM: 661.20  3/14/2020 - 3/16/2020        RESOLVED: Abdominal cramping affecting pregnancy ICD-10-CM: O26.899, R10.9  ICD-9-CM: 646.80, 789.00  3/11/2020 - 3/16/2020        RESOLVED: Abnormal glucose tolerance test ICD-10-CM: R73.09  ICD-9-CM: 790.22  2019 - 3/16/2020    Overview Addendum 2020  1:23 PM by PAPO Mathis     135 mg/dl on 19  HGB A1C: 4.9 on 20  Declined scheduling 3-hr GTT on 2020             RESOLVED: History of eclampsia ICD-10-CM: Z87.59  ICD-9-CM: V13.29  2019 - 2020    Overview Addendum 2019  8:36 AM by Andree Peacock RN     H/o postpartum eclampsia 2 weeks after last delivery. 24-hour urine and PIH labs next visit. Start ASA 81 mg daily at 12 weeks. Total protein: 105 mg/dl  [facility] appt 10/11/09 at 2 pm             RESOLVED:  delivery ICD-10-CM: O60.10X0  ICD-9-CM: 644.21  2019 - 3/16/2020    Overview Addendum 2019 11:41 AM by Aida Coley NP     H/o PTD at 27 weeks, PPROM. No PTL. Pt was counseled by [facility] 10/11/19 regarding recommendation for Claribel injections- pt has declined [place] and vaginal progesterone. RESOLVED: Obesity affecting pregnancy in first trimester ICD-10-CM: O99.211  ICD-9-CM: 649.13  2019 - 3/16/2020    Overview Addendum 3/9/2020 12:49 PM by MD Drake Johnson Asp U/S at [address] [facility] 20-21 weeks. Bi-weekly NSTs starting 37 weeks. Morph us 19  EFW 2lb 8oz 17%, vertex. Anesthesia consult appt 20- okay for delivery at [facility] per anesthesiologist- Dr. Rodrick Mccollum  3/9/20  EFW 6lb 6oz 26%, vertex. Discharge Condition: Good    Hospital Course: The patient underwent  laparoscopic sleeve gastrectomy  on 2021. The patient tolerated the procedure well. Vital signs remained stable and the patient was transferred to  3rd floor surgical unit without complications.  The patient remained stable throughout the first night post operatively with stable vital signs and adequate urine output and pain control. Pain was controlled with Dilaudid IV and IV Tylenol . The patient on the first morning post operative was transferred to the radiology suite where they underwent a gastrograffin UGI study which showed no evidence of a leak or stricture. The drain was discontinued on POD # 1 and the patient was started on a bariatric liquid diet with protein shakes. The patient progressed throughout the day and was ambulating well and tolerating their diet. They were therefore discharged home with instructions to notify me with any issues that may arise. Significant Diagnostic Studies:   No results for input(s): HGB, HGBEXT in the last 72 hours. No results for input(s): HCT, HCTEXT in the last 72 hours. Current Discharge Medication List      CONTINUE these medications which have NOT CHANGED    Details   levonorgestreL (Mirena) 20 mcg/24 hours (5 yrs) 52 mg IUD 1 Device by IntraUTERine route once. STOP taking these medications       ergocalciferol (ERGOCALCIFEROL) 1,250 mcg (50,000 unit) capsule Comments:   Reason for Stopping:               Activity: activity as tolerated with no heavy lifting of greater than 20 pounds. No anti- inflammatory medications. Use stool softeners at home as needed while taking pain medications since they are constipating. Diet: Bariatric liquid diet    Wound Care: Keep wound clean and dry, Reinforce dressing PRN and ice to area for comfort. Do not get wound wet for 2 days.     Follow-up: 14 days with Dr Cecily Velarde M.D

## 2021-01-20 NOTE — PROGRESS NOTES
Problem: Falls - Risk of  Goal: *Absence of Falls  Description: Document Cherylle Cockayne Fall Risk and appropriate interventions in the flowsheet.   Outcome: Progressing Towards Goal  Note: Fall Risk Interventions:  Mobility Interventions: Patient to call before getting OOB         Medication Interventions: Teach patient to arise slowly, Patient to call before getting OOB                   Problem: Pain  Goal: *Control of Pain  Outcome: Progressing Towards Goal

## 2021-01-20 NOTE — PROGRESS NOTES
Transition of Care (MELISSA) Plan:    Home with physician follow up     Chart reviewed. Pt admitted for an elective surgical procedure (LAPAROSCOPIC GASTRIC SLEEVE, WEDGE LIVER BIOPSY AND INTRAOPERATIVE ENDOSCOPY). Pt is independent. Please encourage ambulation. No transition of care needs identified at this time. Anticipate pt will be medically stable for discharge within the next 24-48 hours with physician follow up. CM available to assist as needed. MELISSA Transportation:   How is patient being transported at discharge? Family/Friend      When? Once cleared by physician     Is transport scheduled? N/A      Follow-up appointment and transportation:   PCP/Specialist?  See AVS for Appointment         Who is transporting to the follow-up appointment? Self/Family/Friend      Is transport for follow up appointment scheduled? N/A    Communication plan (with patient/family): Who is being called? Patient or Next of Kin? Responsible party? Patient      What number(s) is to be used? See Facesheet      What service provider is calling for [facility] services? When are they calling? Readmission Risk? (Green/Low; Yellow/Moderate; Red/High):  Green      Care Management Interventions  Mode of Transport at Discharge:  Other (see comment)(Family)  Transition of Care Consult (CM Consult): Discharge Planning  Health Maintenance Reviewed: Yes  Current Support Network: Lives with Spouse  The Plan for Transition of Care is Related to the Following Treatment Goals : Home with physician follow up   Discharge Location  Discharge Placement: Home with family assistance

## 2021-01-22 ENCOUNTER — TELEPHONE (OUTPATIENT)
Dept: SURGERY | Age: 31
End: 2021-01-22

## 2021-01-22 NOTE — TELEPHONE ENCOUNTER
This RN spoke with patient post-operatively. Sipping: Patient is up to sipping 60 ounces as of last night. Nausea and/or vomitting: None    Pain: Currently managed with Percocet and/or Tylenol    Lovenox injections: Administering every 12 hours, rotating sites. RN reminded patient to complete all injections, in which patient verbalized understanding. Lap sites: No erythema, drainage, and/or swelling    LAKISHA drain site: No drainage; dressing removed    BM: None to date, but is passing flatus. Ambulation: Patient is walking throughout house every hour. IS: Patient continues to use 10x's per hour while awake. She has reached 2,000 mL. Temperature: 97.8 degrees    Pulse: 80 bpm    Medications: (confirmed currently taking)   *Multi-vitamin: yes   *Probiotic: yes   *Prilosec: yes    Questions: None    This RN reminded the patient to contact the office with any questions and/or concerns. RN also reminded patient they will receive another follow-up TC prior to the two week post-op follow-up appointment. Patient verbalized understanding to both. Patient's two week post-op visit is scheduled and was confirmed.

## 2021-01-28 ENCOUNTER — TELEPHONE (OUTPATIENT)
Dept: SURGERY | Age: 31
End: 2021-01-28

## 2021-02-03 ENCOUNTER — OFFICE VISIT (OUTPATIENT)
Dept: SURGERY | Age: 31
End: 2021-02-03
Payer: COMMERCIAL

## 2021-02-03 VITALS
DIASTOLIC BLOOD PRESSURE: 64 MMHG | HEART RATE: 79 BPM | BODY MASS INDEX: 44.84 KG/M2 | WEIGHT: 237.5 LBS | SYSTOLIC BLOOD PRESSURE: 124 MMHG | HEIGHT: 61 IN | OXYGEN SATURATION: 97 %

## 2021-02-03 DIAGNOSIS — K90.9 INTESTINAL MALABSORPTION, UNSPECIFIED TYPE: Primary | ICD-10-CM

## 2021-02-03 DIAGNOSIS — Z98.84 S/P LAPAROSCOPIC SLEEVE GASTRECTOMY: ICD-10-CM

## 2021-02-03 PROCEDURE — 99024 POSTOP FOLLOW-UP VISIT: CPT | Performed by: NURSE PRACTITIONER

## 2021-02-03 RX ORDER — OMEPRAZOLE 20 MG/1
20 TABLET, DELAYED RELEASE ORAL DAILY
COMMUNITY

## 2021-02-03 RX ORDER — BISMUTH SUBSALICYLATE 262 MG
1 TABLET,CHEWABLE ORAL DAILY
COMMUNITY

## 2021-02-03 RX ORDER — CHOLECALCIFEROL (VITAMIN D3) 50 MCG
CAPSULE ORAL
COMMUNITY

## 2021-02-03 NOTE — PATIENT INSTRUCTIONS
Continue focus on hydration. May advance to soft diet. Please review material in your binder. Remember - your motto is \"soft foods with protein\". Eat regularly. Continue to use protein shakes. Remember to eat slowly & not to drink fluids with your meals. Increase activity as able - cardio (walking) only. Continue to take multi-vitamins. Continue regular follow-up with your PCP. Return to office in 2 weeks for your next appointment. Call the office if you have any questions or concerns.

## 2021-02-03 NOTE — PROGRESS NOTES
Subjective: Brooklyn Garza is a 26 y.o. female is now 2 weeks status post laparoscopic sleeve gastrectomy. Doing well overall. She has lost a total of 21 pounds since surgery. Body mass index is 44.88 kg/m². Currently on a liquid diet without difficulty. Taking in 64 oz water daily. Sources of protein include protein shakes. No structured exercise, but increasing daily activity   Bowel movements are regular. The patient is not having any pain. . The patient is compliant with multivitamins. Surgery related complications: NA.  Liver bx report reviewed with patient. Stomach bx report reviewed with patient. Weight Loss Metrics 2/3/2021 2021 2021 2020 12/10/2020 2020 2020   Today's Wt 237 lb 8 oz 258 lb 1 oz 254 lb 6.4 oz 249 lb 258 lb 250 lb 256 lb   BMI 44.88 kg/m2 48.76 kg/m2 48.07 kg/m2 47.05 kg/m2 48.75 kg/m2 47.24 kg/m2 48.37 kg/m2          Comorbidities:    Hypertension: not applicable  Diabetes: not applicable  Obstructive Sleep Apnea: not applicable  Hyperlipidemia: not applicable  Stress Urinary Incontinence: not applicable  Gastroesophageal Reflux: not applicable  Weight related arthropathy:not applicable    Denies diagnosis of COVID-19 since surgery.      Patient Active Problem List   Diagnosis Code    History of  premature rupture of membranes (PPROM) Z87.59    History of bilateral breast reduction surgery Z98.890    Epilepsy ([address] G40.909    Gestational hypertension O13.9    Seizures ([address] R56.9    Morbid obesity ([address] E66.01    Morbid obesity with body mass index (BMI) of 40.0 to 49.9 (HCC) E66.01    Uses birth control Z78.9    Post-operative state Z98.890        Past Medical History:   Diagnosis Date    Epilepsy ([address]     during childbirth / [place] [facility]    Gestational hypertension     Morbid obesity ([address]     Morbid obesity with body mass index (BMI) of 40.0 to 49.9 (HCC)     Seizures ([address]     Uses birth control     IUD       Past Surgical History:   Procedure Laterality Date    HX BREAST REDUCTION Bilateral 12/7/2015    BILATERAL BREAST REDUCTION W/FREE NIPPLE GRAFT performed by Jared Rome MD at [address] HX OTHER SURGICAL  2007    excision bartholin gland cyst       Current Outpatient Medications   Medication Sig Dispense Refill    omeprazole (PriLOSEC OTC) 20 mg tablet Take 20 mg by mouth daily.  B.infantis-B.ani-B.long-B.bifi (Probiotic 4X) 10-15 mg TbEC Take  by mouth.  multivitamin (ONE A DAY) tablet Take 1 Tab by mouth daily.  levonorgestreL (Mirena) 20 mcg/24 hours (5 yrs) 52 mg IUD 1 Device by IntraUTERine route once.          No Known Allergies    Review of Symptoms:       General - No history or complaints of unexpected fever or chills  Head/Neck - No history or complaints of headache or dizziness  Cardiac - No history or complaints of chest pain, palpitations, or shortness of breath  Pulmonary - No history or complaints of shortness of breath or productive cough  Gastrointestinal - as noted above  Genitourinary - No history or complaints of hematuria/dysuria or renal lithiasis  Musculoskeletal - No history or complaints of joint  muscular weakness  Hematologic - No history of any bleeding episodes  Neurologic - No history or complaints of  migraine headaches or neurologic symptoms        Objective:     Visit Vitals  /64 (BP 1 Location: Left upper arm, BP Patient Position: Sitting)   Pulse 79   Ht 5' 1\" (1.549 m)   Wt 107.7 kg (237 lb 8 oz)   SpO2 97%   BMI 44.88 kg/m²       General:  alert, cooperative, no distress, appears stated age   Chest: lungs clear to auscultation, breath sounds equal and symmetric, no rhonchi, rales or wheezes, no accessory muscle use   Cor:   Regular rate and rhythm, S1S2 present or without murmur or extra heart sounds   Abdomen: soft, bowel sounds active, non-tender, no masses or organomegaly   Incisions:   healing well, no drainage, no erythema, no hernia, no seroma, no swelling, no dehiscence, incision well approximated       STOMACH, GASTRIC SLEEVE RESECTION   NO SPECIFIC PATHOLOGIC ABNORMALITY. NO DYSPLASIA OR MALIGNANCY IDENTIFIED. LIVER, WEDGE BIOPSY   MILD STEATOSIS (APPROXIMATELY 5%). NO BRIDGING FIBROSIS OR CIRRHOSIS IDENTIFIED. PREPYLORUS, BIOPSY   MILD REACTIVE FOVEOLAR CHANGES. NO DYSPLASIA OR MALIGNANCY IDENTIFIED. Assessment:   History of Morbid obesity, status post laparoscopic sleeve gastrectomy. Doing well postoperatively. Plan:     1. Increase activity to the goal of 30 minutes daily and Increase fluids  2. Advance diet to soft solid phase. Reminded to measure portions, continue high protein, low carbohydrate diet. Reminded to eat regularly, to eat slowly & not to drink with meals. Refer to the handbook given in class. 3. Continue multivitamin   4. Continue current medications and follow up with PCP for management of regimen. 5. Encouraged to attend support group   6. I have discussed this plan with patient and they verbalized understanding  7. Follow up in 2 weeks or sooner if patient has questions, concerns or worsening of condition, if unable to reach our office, patient should report to the ED. 8. Ms. Jethro Deluna has a reminder for a \"due or due soon\" health maintenance. I have asked that she contact her primary care provider for a follow-up on this health maintenance.

## 2021-02-17 ENCOUNTER — OFFICE VISIT (OUTPATIENT)
Dept: SURGERY | Age: 31
End: 2021-02-17
Payer: COMMERCIAL

## 2021-02-17 VITALS
HEIGHT: 61 IN | WEIGHT: 238.4 LBS | DIASTOLIC BLOOD PRESSURE: 80 MMHG | SYSTOLIC BLOOD PRESSURE: 101 MMHG | OXYGEN SATURATION: 100 % | HEART RATE: 85 BPM | BODY MASS INDEX: 45.01 KG/M2

## 2021-02-17 DIAGNOSIS — K90.9 INTESTINAL MALABSORPTION, UNSPECIFIED TYPE: Primary | ICD-10-CM

## 2021-02-17 DIAGNOSIS — Z98.84 S/P LAPAROSCOPIC SLEEVE GASTRECTOMY: ICD-10-CM

## 2021-02-17 PROCEDURE — 99024 POSTOP FOLLOW-UP VISIT: CPT | Performed by: NURSE PRACTITIONER

## 2021-02-17 RX ORDER — URSODIOL 500 MG/1
500 TABLET, FILM COATED ORAL DAILY
Qty: 30 TAB | Refills: 4 | Status: SHIPPED | OUTPATIENT
Start: 2021-02-17 | End: 2021-03-19

## 2021-02-17 NOTE — PROGRESS NOTES
Subjective: Evelyne Henry is a 26 y.o. female is now 1 months status post laparoscopic sleeve gastrectomy. Doing well overall. She has lost a total of 20 pounds since surgery. Body mass index is 45.05 kg/m². Has lost 15% of EBW. Currently on a soft food diet without difficulty, reports no real issues and denies vomiting, abdominal pain, diarrhea and reflux. Taking in 64oz water daily. Sources of protein include chicken, tuna, salmon, shrimp, crab. No structured exercise, but increasing activity. Bowel movements are regular. The patient is not having any pain. . The patient is compliant with multivitamin. Weight Loss Metrics 2021 2/3/2021 2021 2021 2020 12/10/2020 2020   Today's Wt 238 lb 6.4 oz 237 lb 8 oz 258 lb 1 oz 254 lb 6.4 oz 249 lb 258 lb 250 lb   BMI 45.05 kg/m2 44.88 kg/m2 48.76 kg/m2 48.07 kg/m2 47.05 kg/m2 48.75 kg/m2 47.24 kg/m2          Comorbidities:    Hypertension: not applicable  Diabetes: not applicable  Obstructive Sleep Apnea: not applicable  Hyperlipidemia: not applicable  Stress Urinary Incontinence: not applicable  Gastroesophageal Reflux: not applicable  Weight related arthropathy:not applicable    Denies diagnosis of COVID-19 since surgery.      Patient Active Problem List   Diagnosis Code    History of  premature rupture of membranes (PPROM) Z87.59    History of bilateral breast reduction surgery Z98.890    Epilepsy ([address] G40.909    Gestational hypertension O13.9    Seizures ([address] R56.9    Morbid obesity ([address] E66.01    Morbid obesity with body mass index (BMI) of 40.0 to 49.9 ([facility]) E66.01    Uses birth control Z78.9    Post-operative state Z98.890    Intestinal malabsorption K90.9    S/P laparoscopic sleeve gastrectomy Z98.84        Past Medical History:   Diagnosis Date    Epilepsy ([address]     during childbirth / [facility]    Gestational hypertension     Intestinal malabsorption 2/3/2021    Morbid obesity ([address]     Morbid obesity with body mass index (BMI) of 40.0 to 49.9 ([facility])     Seizures ([facility] 2012    Uses birth control     IUD       Past Surgical History:   Procedure Laterality Date    HX BREAST REDUCTION Bilateral 12/7/2015    BILATERAL BREAST REDUCTION W/FREE NIPPLE GRAFT performed by Lian Jamil MD at [address] HX OTHER SURGICAL  2007    excision bartholin gland cyst    RI LAP, MERYL RESTRICT PROC, LONGITUDINAL GASTRECTOMY  01/19/2021    Dr Zack Segovia       Current Outpatient Medications   Medication Sig Dispense Refill    omeprazole (PriLOSEC OTC) 20 mg tablet Take 20 mg by mouth daily.  B.infantis-B.ani-B.long-B.bifi (Probiotic 4X) 10-15 mg TbEC Take  by mouth.  multivitamin (ONE A DAY) tablet Take 1 Tab by mouth daily.  levonorgestreL (Mirena) 20 mcg/24 hours (5 yrs) 52 mg IUD 1 Device by IntraUTERine route once.          No Known Allergies    Review of Symptoms:       General - No history or complaints of unexpected fever or chills  Head/Neck - No history or complaints of headache or dizziness  Cardiac - No history or complaints of chest pain, palpitations, or shortness of breath  Pulmonary - No history or complaints of shortness of breath or productive cough  Gastrointestinal - as noted above  Genitourinary - No history or complaints of hematuria/dysuria or renal lithiasis  Musculoskeletal - No history or complaints of joint  muscular weakness  Hematologic - No history of any bleeding episodes  Neurologic - No history or complaints of  migraine headaches or neurologic symptoms        Objective:     Visit Vitals  /80 (BP 1 Location: Left upper arm, BP Patient Position: Sitting)   Pulse 85   Ht 5' 1\" (1.549 m)   Wt 108.1 kg (238 lb 6.4 oz)   SpO2 100%   BMI 45.05 kg/m²       General:  alert, cooperative, no distress, appears stated age   Chest: lungs clear to auscultation, breath sounds equal and symmetric, no rhonchi, rales or wheezes, no accessory muscle use   Cor:   Regular rate and rhythm, S1S2 present or without murmur or extra heart sounds   Abdomen: soft, bowel sounds active, non-tender, no masses or organomegaly   Incisions:   healing well, no drainage, no erythema, no hernia, no seroma, no swelling, no dehiscence, incision well approximated       Assessment:   History of Morbid obesity, status post laparoscopic sleeve gastrectomy. Doing well postoperatively. Plan:     1. Increase activity to the goal of 30 minutes daily and Increase fluids   2. Start 500mg [place] today, stop after 6 months out from surgery. 3. Patient is cleared to return to work without restrictions. 4. Can stop probiotic    5. Advance diet to solid phase. Reminded to measure portions, continue high protein, low carbohydrate diet. Reminded to eat regularly, to eat slowly & not to drink with meals. Refer to the handbook given in class. 6. Patient has appt with dietician directly after this visit. 7. Continue multivitamin and add the additional vitamin supplementation (Ca, B complex, B12, D, all listed in handbook)  8. Continue current medications and follow up with PCP for management of regimen. 9. Continue cardio exercise and add resistance exercises. Discussed with patient. Minimum of 30 minutes of exercise daily. 10. Encouraged to attend support group   11. I have discussed this plan with patient and they verbalized understanding  12. Follow up in 2 months or sooner if patient has questions, concerns or worsening of condition, if unable to reach our office, patient should report to the ED. 15. Ms. aN Stevens has a reminder for a \"due or due soon\" health maintenance. I have asked that she contact her primary care provider for a follow-up on this health maintenance.

## 2021-02-17 NOTE — PATIENT INSTRUCTIONS
May advance diet to solid phase. Remember to measure portions, to keep the focus on increasing your protein & keeping your carbs low. Continue the use of protein shakes. To follow recommendations of dietician. Stay hydrated! Eat regularly, eat slowly & do not drink with your meals. Vitamins to be taken include your multivitamin, B12, calcium citrate, Vit D & Bcomplex. Refer to your notebook & handouts for dosages. Continue to increase cardio activity. May add resistance exercises. Continue follow-up with your PCP. Return to this office in 1 month. Call if questions/concerns prior to your office visit. Learning About Physical Activity What is physical activity? Physical activity is any kind of activity that gets your body moving. The types of physical activity that can help you get fit and stay healthy include: · Aerobic or \"cardio\" activities that make your heart beat faster and make you breathe harder, such as brisk walking, riding a bike, or running. Aerobic activities strengthen your heart and lungs and build up your endurance. · Strength training activities that make your muscles work against, or \"resist,\" something, such as lifting weights or doing push-ups. These activities help tone and strengthen your muscles. · Stretches that allow you to move your joints and muscles through their full range of motion. Stretching helps you be more flexible and avoid injury. What are the benefits of physical activity? Being active is one of the best things you can do for your health. It helps you to: · Feel stronger and have more energy to do all the things you like to do. · Focus better at school or work. · Feel, think, and sleep better. · Reach and stay at a healthy weight. · Lose fat and build lean muscle. · Lower your risk for serious health problems. · Keep your bones, muscles, and joints strong. How can you make physical activity part of your life? Get at least 30 minutes of exercise on most days of the week. Walking is a good choice. You also may want to do other activities, such as running, swimming, cycling, or playing tennis or team sports. Pick activities that you likeones that make your heart beat faster, your muscles stronger, and your muscles and joints more flexible. If you find more than one thing you like doing, do them all. You don't have to do the same thing every day. Get your heart pumping every day. Any activity that makes your heart beat faster and keeps it at that rate for a while counts. Here are some great ways to get your heart beating faster: · Go for a brisk walk, run, or bike ride. · Go for a hike or swim. · Go in-line skating. · Play a game of touch football, basketball, or soccer. · Ride a bike. · Play tennis or racquetball. · Climb stairs. Even some household chores can be aerobicjust do them at a faster pace. Vacuuming, raking or mowing the lawn, sweeping the garage, and washing and waxing the car all can help get your heart rate up. Strengthen your muscles during the week. You don't have to lift heavy weights or grow big, bulky muscles to get stronger. Doing a few simple activities that make your muscles work against, or \"resist,\" something can help you get stronger. For example, you can: · Do push-ups or sit-ups, which use your own body weight as resistance. · Lift weights or dumbbells or use stretch bands at home or in a gym or community center. Stretch your muscles often. Stretching will help you as you become more active. It can help you stay flexible, loosen tight muscles, and avoid injury. It can also help improve your balance and posture and can be a great way to relax. Be sure to stretch the muscles you'll be using when you work out. It's best to warm your muscles slightly before you stretch them. Walk or do some other light aerobic activity for a few minutes, and then start stretching. When you stretch your muscles: · Do it slowly. Stretching is not about going fast or making sudden movements. · Don't push or bounce during a stretch. · Hold each stretch for at least 15 to 30 seconds, if you can. You should feel a stretch in the muscle, but not pain. · Breathe out as you do the stretch. Then breathe in as you hold the stretch. Don't hold your breath. If you're worried about how more activity might affect your health, have a checkup before you start. Follow any special advice your doctor gives you for getting a smart start. Where can you learn more? Go to http://www.gray.com/ Enter W642 in the search box to learn more about \"Learning About Physical Activity. \" Current as of: January 16, 2020               Content Version: 12.6 © 2336-0666 [employer]. Care instructions adapted under license by [employer] (which disclaims liability or warranty for this information). If you have questions about a medical condition or this instruction, always ask your healthcare professional. [address] any warranty or liability for your use of this information.

## 2021-04-13 ENCOUNTER — TELEPHONE (OUTPATIENT)
Dept: OTHER | Age: 31
End: 2021-04-13

## 2021-04-19 ENCOUNTER — OFFICE VISIT (OUTPATIENT)
Dept: SURGERY | Age: 31
End: 2021-04-19
Payer: COMMERCIAL

## 2021-04-19 VITALS
OXYGEN SATURATION: 100 % | WEIGHT: 220.25 LBS | HEART RATE: 76 BPM | TEMPERATURE: 97.7 F | BODY MASS INDEX: 41.58 KG/M2 | SYSTOLIC BLOOD PRESSURE: 104 MMHG | DIASTOLIC BLOOD PRESSURE: 69 MMHG | HEIGHT: 61 IN

## 2021-04-19 DIAGNOSIS — E55.9 HYPOVITAMINOSIS D: ICD-10-CM

## 2021-04-19 DIAGNOSIS — K90.9 INTESTINAL MALABSORPTION, UNSPECIFIED TYPE: Primary | ICD-10-CM

## 2021-04-19 DIAGNOSIS — Z98.84 S/P LAPAROSCOPIC SLEEVE GASTRECTOMY: ICD-10-CM

## 2021-04-19 PROBLEM — Z98.890 POST-OPERATIVE STATE: Status: RESOLVED | Noted: 2021-01-19 | Resolved: 2021-04-19

## 2021-04-19 PROCEDURE — 99024 POSTOP FOLLOW-UP VISIT: CPT | Performed by: NURSE PRACTITIONER

## 2021-04-19 RX ORDER — ERGOCALCIFEROL 1.25 MG/1
50000 CAPSULE ORAL
COMMUNITY

## 2021-04-19 RX ORDER — MAGNESIUM 200 MG
1000 TABLET ORAL DAILY
COMMUNITY

## 2021-04-19 NOTE — PROGRESS NOTES
Subjective: Mala Wilkins  is a 26 y.o. female who presents for follow-up about 3 months following laparoscopic sleeve gastrectomy. She has lost a total of 38 pounds since surgery. Body mass index is 41.62 kg/m². . EBWL is (28%). The patient presents today to assess their progress toward their goal of weight loss and to address any issues that may be present. Today the patient and I have reviewed their diet and how appropriate their food choices are. The following issues have been identified - none from a surgical standpoint. Surgery related complication: NA       She reports no real issues and denies vomiting, abdominal pain, diarrhea and reflux. The patients diet choices have been reviewed today and counseling was given. Fluid intake: 64 oz      Protein intake: good - 1 protein supplement + food; eggs, tuna. steak, chicken, salmon, shrimp, cheese, nuts      Meals/day: 3    Patients pain score:0/10    The patient's exercise level: moderately active. Gets steps at work, but no structured exercise    Changes in her medical history and medications have been reviewed.     Just had labs at PCP, reviewed with patient in her portal:  Vit D 15.8 (not restarted weekly supplement)  Crt 0.7  K 4.0  Albumin 4.2  LFTs WNL    Comorbidities:    Hypertension: not applicable  Diabetes: not applicable  Obstructive Sleep Apnea: not applicable  Hyperlipidemia: not applicable  Stress Urinary Incontinence: not applicable  Gastroesophageal Reflux: not applicable  Weight related arthropathy:not applicable    Patient Active Problem List   Diagnosis Code    History of  premature rupture of membranes (PPROM) Z87.59    History of bilateral breast reduction surgery Z98.890    Epilepsy ([facility] [address] G40.909    Gestational hypertension O13.9    Seizures ([facility] [address] R56.9    Morbid obesity ([facility] [address] E66.01    Morbid obesity with body mass index (BMI) of 40.0 to 49.9 ([facility]) E66.01    Uses birth control Z78.9    Intestinal malabsorption K90.9    S/P laparoscopic sleeve gastrectomy Z98.84    Hypovitaminosis D E55.9     Past Medical History:   Diagnosis Date    Epilepsy ([facility] [address]     during childbirth / eclapmsia    Gestational hypertension     Hypovitaminosis D     Intestinal malabsorption 2/3/2021    Morbid obesity ([facility] [address]     Morbid obesity with body mass index (BMI) of 40.0 to 49.9 (HCC)     Seizures ([facility] [address] 2012    Uses birth control     IUD     Past Surgical History:   Procedure Laterality Date    HX BREAST REDUCTION Bilateral 12/7/2015    BILATERAL BREAST REDUCTION W/FREE NIPPLE GRAFT performed by Oral Ellis MD at [address] HX OTHER SURGICAL  2007    excision bartholin gland cyst    OR LAP, MERYL RESTRICT PROC, LONGITUDINAL GASTRECTOMY  01/19/2021    Dr Chelsey Louis     Current Outpatient Medications   Medication Sig Dispense Refill    cyanocobalamin (VITAMIN B-12) 1,000 mcg sublingual tablet Take 1,000 mcg by mouth daily.  ergocalciferol (ERGOCALCIFEROL) 1,250 mcg (50,000 unit) capsule Take 50,000 Units by mouth.  omeprazole (PriLOSEC OTC) 20 mg tablet Take 20 mg by mouth daily.  B.infantis-B.ani-B.long-B.bifi (Probiotic 4X) 10-15 mg TbEC Take  by mouth.  multivitamin (ONE A DAY) tablet Take 1 Tab by mouth daily.  levonorgestreL (Mirena) 20 mcg/24 hours (5 yrs) 52 mg IUD 1 Device by IntraUTERine route once.           Review of Symptoms:       General - No history or complaints of unexpected fever or chills  Head/Neck - No history or complaints of headache or dizziness  Cardiac - No history or complaints of chest pain, palpitations, or shortness of breath  Pulmonary - No history or complaints of shortness of breath or productive cough  Gastrointestinal - as noted above  Genitourinary - No history or complaints of hematuria/dysuria or renal lithiasis  Musculoskeletal - No history or complaints of joint  muscular weakness  Hematologic - No history of any bleeding episodes  Neurologic - No history or complaints of  migraine headaches or neurologic symptoms                     Objective:     Visit Vitals  /69 (BP 1 Location: Right arm, BP Patient Position: Sitting, BP Cuff Size: Adult long)   Pulse 76   Temp 97.7 °F (36.5 °C)   Ht 5' 1\" (1.549 m)   Wt 99.9 kg (220 lb 4 oz)   SpO2 100%   BMI 41.62 kg/m²        Physical Exam:      General appearance:  alert, cooperative, no distress, appears stated age   Mental status   alert, oriented to person, place, and time   Neck  supple, no significant adenopathy     Lymphatics  no palpable lymphadenopathy, no hepatosplenomegaly   Chest  clear to auscultation, no wheezes, rales or rhonchi, symmetric air entry   Heart  normal rate, regular rhythm, normal S1, S2, no murmurs, rubs, clicks or gallops    Abdomen: soft, nontender, nondistended, no masses or organomegaly   Incision:  well healed       Neurological  alert, oriented, normal speech, no focal findings or movement disorder noted   Musculoskeletal no joint tenderness, deformity or swelling   Extremities peripheral pulses normal, no pedal edema, no clubbing or cyanosis   Skin normal coloration and turgor, no rashes, no suspicious skin lesions noted      Lab Results   Component Value Date/Time    WBC 7.7 01/13/2021 03:14 PM    Hemoglobin (POC) 11.7 04/24/2020 02:42 PM    HGB 13.0 01/13/2021 03:14 PM    HCT 40.9 01/13/2021 03:14 PM    PLATELET 865 37/71/4721 03:14 PM    MCV 96.2 01/13/2021 03:14 PM     Lab Results   Component Value Date/Time    Sodium 141 01/13/2021 03:14 PM    Potassium 4.1 01/13/2021 03:14 PM    Chloride 106 01/13/2021 03:14 PM    CO2 32 01/13/2021 03:14 PM    Anion gap 3 01/13/2021 03:14 PM    Glucose 82 01/13/2021 03:14 PM    BUN 12 01/13/2021 03:14 PM    Creatinine 0.78 01/13/2021 03:14 PM    BUN/Creatinine ratio 15 01/13/2021 03:14 PM    GFR est AA >60 01/13/2021 03:14 PM    GFR est non-AA >60 01/13/2021 03:14 PM    Calcium 9.0 01/13/2021 03:14 PM    Bilirubin, total 0.3 01/13/2021 03:14 PM    Alk. phosphatase 73 01/13/2021 03:14 PM    Protein, total 7.7 01/13/2021 03:14 PM    Albumin 3.9 01/13/2021 03:14 PM    Globulin 3.8 01/13/2021 03:14 PM    A-G Ratio 1.0 01/13/2021 03:14 PM    ALT (SGPT) 18 01/13/2021 03:14 PM     No results found for: IRON, FE, TIBC, IBCT, PSAT, FERR  No results found for: FOL, RBCF  No results found for: VITD3, XQVID2, XQVID3, XQVID, VD3RIA        Assessment and Plan:   1. Intestinal malabsorption  a. continue required Vitamins: B12, B complex, D, iron, calcium, multivitamin  2. S/p laparoscopic bariatric surgery, SLEEVE GASTRECTOMY, history of morbid obesity. a. Sleep goal is 7-9 hours each night. Patient education given on the effects of sleep deprivation on weight control. b. Discussed patients weight loss goals and dietary choices in relation to goals. c. Reminded to measure portions, continue high protein, low carbohydrate diet. Reminded to eat regularly, to eat slowly & not to drink with meals. d. Continue cardio exercise and add resistance exercises. 60-90 minutes of aerobic activity 5 days a week and strength training 2 days each week. e. Encouraged to attend support group   f. Required fluid intake is >64oz daily of decaffeinated sugar free beverages. 3. Hypovitaminosis D - resume weekly supplementation and repeat labs at 6 month visit    Labs ordered today  Follow up in 3 months or sooner if patient has questions, concerns or worsening of condition, if unable to reach our office, patient should report to the ED. Ms. Regina Rondon has a reminder for a \"due or due soon\" health maintenance. I have asked that she contact her primary care provider for a follow-up on this health maintenance. Total time spent with the patient 30 minutes.

## 2021-04-19 NOTE — PATIENT INSTRUCTIONS
Aim 80-90g protein 800-1000 calories Stay below 50g carbs 150 minutes cardio, 2-3 days resistance Patient Instructions 1. Remember hydration goals - minimum of 64 ounces of liquids per day (dehydration is the number one reason for hospital readmission). 2. Continue to monitor carbohydrate and protein intake you need a minimum of  Grams of protein daily- remember to keep your total carbohydrates to 50 grams or less per day for best results. 3. Continue to work towards exercise goals - 60-90 minutes, 5 times a week minimum of deliberate, aerobic exercise is the ultimate goal with strength training 2 times each week. Refer to [employer] for  information. 4. Remember to take vitamins as directed. 5. Attend support group the 2nd Thursday of each month. 6.  Constipation: Milk of Magnesia is for immediate relief only. Miralax is to be used every day if constipation is a chronic problem. 7.  Diarrhea: patients will occasionally develop lactose intolerance after surgery. Check to see if your protein shake has whey in it. If it does try a protein powder or drink that does not have whey and stop all yogurts, cheeses and milks to see if the diarrhea goes away. 8.  If you have had labs drawn. We will only call you if you have abnormal results. Otherwise you can access the lab results in \"OneTouchhart\". You will only need the access code the first time you sign on. 9.  Call us at [phone] or email us through [facility] with questions,     concerns or worsening of condition, we have someone on call 24 hours a day. If you are unable to reach our office, you are to go to your Primary Care Physician or the Emergency Department. NOTE TO GASTRIC BYPASS PATIENTS:  (SAME APPLIES TO GASTRIC SLEEVE PATIENTS FOR FIRST TWO MONTHS) Remember that for the rest of your life, you are not able to take the following: 
- NSAIDs (ibuprofen, goody powder, BC powder, Motrin, Advil, Mobic, Voltaren, Excedrin, etc.) - Steroid pills or injections - Smoke (cigarettes or recreational drugs) - Alcohol Use of any of the above may cause ulcers in your stomach which may perforate causing a medical emergency and surgery. Speak to our medical staff if another medical provider requires you to take steroids or NSAIDs. Supplement Resource Guide Importance of Protein:  
Maintains lean body mass, produces antibodies to fight off infections, heals wounds, minimizes hair loss, helps to give you energy, helps with satiety, and keeping you full between meals. Importance of Calcium: 
Needed for healthy bones and teeth, normal blood clotting, and nervous system functioning, higher risk of osteoporosis and bone disease with non-compliance. Importance of Multivitamins: Many functions. Supply you with extra nutrients that you may be missing from food. May lead to iron deficiency anemia, weakness, fatigue, and many other symptoms with non-compliance. Importance of B Vitamins: 
Important for red blood cell formation, metabolism, energy, and helps to maintain a healthy nervous system. Protein Supplement Find one you like now. Use immediately after surgery. Look for: 
35-50g protein each day from your protein supplement once you reach the progression diet. 0-3 g fat per serving 0-3 g sugar per serving Protein drinks should be split in separate dosages. Recommend: Lifelong 1 year + Calcium Supplement:  
 
Start taking within a month after surgery. Look for: Calcium Citrate Plus D (1500 mg per day) Recommend: Citracal 
 
 . Avoid chocolate chewable calcium. Can use chewable bariatric or [employer] brand or similar chewable. The body cannot absorb more than 500-600 mg of calcium at a time.  
 
 
Take for Life Multi-vitamin Supplement:   
 
Start immediately after surgery: any complete chewable, such as: [place]s Complete chewables. Avoid [place] sours or gummies. They lack iron and other important nutrients and also have added sugar. Continue with chewable vitamin or change to adult complete multivitamin one month after surgery. Menstruating women can take a prenatal vitamin. Make sure has at least 18 mg iron and 757-691 mcg folic acid Vitamin B12, B Complex Vitamin, and Biotin Start taking within a month after surgery. Vitamin B12:  1000 mcg of Vitamin B12 three times weekly Must take sublingually (meaning you take it under your tongue) or in a liquid drop form for easy absorption. B Complex Vitamin: Take a pill or liquid drop form once daily. Biotin: This vitamin can help prevent hair loss. Recommend 5mg  
(5000 mcg) a day Biotin is Optional  
 
 
 
 
  
Learning About Being Physically Active What is physical activity? Being physically active means doing any kind of activity that gets your body moving. The types of physical activity that can help you get fit and stay healthy include: · Aerobic or \"cardio\" activities. These make your heart beat faster and make you breathe harder, such as brisk walking, riding a bike, or running. They strengthen your heart and lungs and build up your endurance. · Strength training activities. These make your muscles work against, or \"resist,\" something. Examples include lifting weights or doing push-ups. These activities help tone and strengthen your muscles and bones. · Stretches. These let you move your joints and muscles through their full range of motion. Stretching helps you be more flexible. What are the benefits of being active? Being active is one of the best things you can do for your health. It helps you to: · Feel stronger and have more energy to do all the things you like to do. · Focus better at school or work. · Feel, think, and sleep better. · Reach and stay at a healthy weight. · Lose fat and build lean muscle.  
· Lower your risk for serious health problems, including diabetes, heart attack, high blood pressure, and some cancers. · Keep your heart, lungs, bones, muscles, and joints strong and healthy. How can you make being active part of your life? Start slowly. Make it your long-term goal to get at least 30 minutes of exercise on most days of the week. Walking is a good choice. You also may want to do other activities, such as running, swimming, cycling, or playing tennis or team sports. Pick activities that you likeones that make your heart beat faster, your muscles stronger, and your muscles and joints more flexible. If you find more than one thing you like doing, do them all. You don't have to do the same thing every day. Get your heart pumping every day. Any activity that makes your heart beat faster and keeps it at that rate for a while counts. Here are some great ways to get your heart beating faster: · Go for a brisk walk, run, or bike ride. · Go for a hike or swim. · Go in-line skating. · Play a game of touch football, basketball, or soccer. · Ride a bike. · Play tennis or racquetball. · Climb stairs. Even some household chores can be aerobicjust do them at a faster pace. Vacuuming, raking or mowing the lawn, sweeping the garage, and washing and waxing the car all can help get your heart rate up. Strengthen your muscles during the week. You don't have to lift heavy weights or grow big, bulky muscles to get stronger. Doing a few simple activities that make your muscles work against, or \"resist,\" something can help you get stronger. For example, you can: · Do push-ups or sit-ups, which use your own body weight as resistance. · Lift weights or dumbbells or use stretch bands at home or in a gym or community center. Stretch your muscles often. Stretching will help you as you become more active. It can help you stay flexible, loosen tight muscles, and avoid injury.  It can also help improve your balance and posture and can be a great way to relax. Be sure to stretch the muscles you'll be using when you work out. It's best to warm your muscles slightly before you stretch them. Walk or do some other light aerobic activity for a few minutes, and then start stretching. When you stretch your muscles: · Do it slowly. Stretching is not about going fast or making sudden movements. · Don't push or bounce during a stretch. · Hold each stretch for at least 15 to 30 seconds, if you can. You should feel a stretch in the muscle, but not pain. · Breathe out as you do the stretch. Then breathe in as you hold the stretch. Don't hold your breath. If you're worried about how more activity might affect your health, have a checkup before you start. Follow any special advice your doctor gives you for getting a smart start. Where can you learn more? Go to http://www.gray.com/ Enter V124 in the search box to learn more about \"Learning About Being Physically Active. \" Current as of: September 10, 2020               Content Version: 12.8 © 4420-1453 [employer]. Care instructions adapted under license by [employer] (which disclaims liability or warranty for this information). If you have questions about a medical condition or this instruction, always ask your healthcare professional. [address] any warranty or liability for your use of this information.

## 2021-08-03 PROBLEM — E66.01 MORBID OBESITY (HCC): Status: RESOLVED | Noted: 2021-08-03 | Resolved: 2021-08-03

## 2021-12-08 ENCOUNTER — DOCUMENTATION ONLY (OUTPATIENT)
Dept: SURGERY | Age: 31
End: 2021-12-08

## 2021-12-08 NOTE — PROGRESS NOTES
Per [facility] requirements;  E-mail and letter sent for follow up appointment. [employer] [facility] [address]      Dear Patient,    Your health is our main concern. It is important for your health to have follow-up lab work and to see your surgeon at 3 months, 6 months and annually after your weight loss surgery. Additionally, the Department of bariatric Surgery at our hospital is a member of the Metabolic and Bariatric Surgery Accreditation and Quality Improvement Program [facility]). As a participant in this program, we gather information on the outcomes of our patients after surgery. Please call the office for a follow up appointment at [phone] [facility]) or [phone] [facility]). If you have moved out of the area or have changed surgeons please call us and let us know the name of your doctor. Your health and feedback are important to us. We greatly appreciate your response.        Thank you,  [employer] [facility] [address] [facility]

## 2022-03-01 NOTE — PROGRESS NOTES
Problem: Patient Education: Go to Patient Education Activity  Goal: Patient/Family Education  Outcome: Progressing Towards Goal     Problem: Vaginal Delivery: Day of Deliver-Laboring  Goal: Activity/Safety  Outcome: Progressing Towards Goal  Goal: Consults, if ordered  Outcome: Progressing Towards Goal  Goal: Diagnostic Test/Procedures  Outcome: Progressing Towards Goal  Goal: Nutrition/Diet  Outcome: Progressing Towards Goal  Goal: Discharge Planning  Outcome: Progressing Towards Goal  Goal: Medications  Outcome: Progressing Towards Goal  Goal: Respiratory  Outcome: Progressing Towards Goal  Goal: Treatments/Interventions/Procedures  Outcome: Progressing Towards Goal  Goal: *Vital signs within defined limits  Outcome: Progressing Towards Goal  Goal: *Labs within defined limits  Outcome: Progressing Towards Goal  Goal: *Hemodynamically stable  Outcome: Progressing Towards Goal  Goal: *Optimal pain control at patient's stated goal  Outcome: Progressing Towards Goal None

## 2022-12-12 ENCOUNTER — DOCUMENTATION ONLY (OUTPATIENT)
Dept: SURGERY | Age: 32
End: 2022-12-12

## 2022-12-12 NOTE — PROGRESS NOTES
Per [facility] requirements;  E-mail and letter sent for follow up appointment. [address] [facility]  [address]   [address], [address]                 [address] [facility] [place]  [facility]  [facility]      Dear Patient,    Your health is our main concern. It is important for your health to have follow-up lab work and to see your surgeon at 3 months, 6 months, 9 months and annually after your weight loss surgery. Additionally, the Department of Bariatric Surgery at our hospital is a member of the Metabolic and Bariatric Surgery Accreditation and Quality Improvement Program [facility]). As a participant in this program, we gather information on the outcomes of our patients after surgery. Please call the office for a follow up appointment at [phone]. If you have moved out of the area or have changed surgeons please call us and let us know the name of your doctor. Your health and feedback are important to us. We greatly appreciate your response.        Thank you,  [address] [facility]

## (undated) DEVICE — TROCAR ENDOSCP L100MM DIA15MM BLDELSS STBL SL ENDOPATH XCEL

## (undated) DEVICE — RELOAD STPL L60MM H1.5-3.6MM REG TISS BLU GRIPPING SURF B

## (undated) DEVICE — [HIGH FLOW INSUFFLATOR,  DO NOT USE IF PACKAGE IS DAMAGED,  KEEP DRY,  KEEP AWAY FROM SUNLIGHT,  PROTECT FROM HEAT AND RADIOACTIVE SOURCES.]: Brand: PNEUMOSURE

## (undated) DEVICE — FORCEPS BX OVL CUP SERR DISP CAP L 240CM RAD JAW 4

## (undated) DEVICE — Device

## (undated) DEVICE — TROCAR ENDOSCP BLDELSS 12X100 MM W/ HNDL STBL SL OPT TIP

## (undated) DEVICE — SHEAR HARMONIC 5MMX45CM -- ACE 7+

## (undated) DEVICE — BARIATRIC: Brand: MEDLINE INDUSTRIES, INC.

## (undated) DEVICE — AGENT HEMSTAT W6XL9IN OXIDIZED REGENERATED CELOS ABSRB FOR

## (undated) DEVICE — SOLUTION SURG PREP 26 CC PURPREP

## (undated) DEVICE — SOLUTION LACTATED RINGERS INJECTION USP

## (undated) DEVICE — ENDOCUT SCISSOR TIP, DISPOSABLE: Brand: RENEW

## (undated) DEVICE — STERILE POLYISOPRENE POWDER-FREE SURGICAL GLOVES: Brand: PROTEXIS

## (undated) DEVICE — TROCAR LAP L100MM DIA5MM BLDELSS W/ STBL SL ENDOPATH XCEL

## (undated) DEVICE — GARMENT,MEDLINE,DVT,INT,CALF,MED, GEN2: Brand: MEDLINE

## (undated) DEVICE — APPLIER CLP L SHFT DIA12MM 20 ROT MULT LIGACLP

## (undated) DEVICE — GOWN ISOLATN REG BLU POLY UNISX W/ THMB LOOP

## (undated) DEVICE — SUTURE ETHLN SZ 3-0 L30IN NONABSORBABLE BLK FSL L30MM 3/8 1671H

## (undated) DEVICE — MAJ-1414 SINGLE USE ADPATER BIOPSY VALV: Brand: SINGLE USE ADAPTOR BIOPSY VALVE

## (undated) DEVICE — TUBING, SUCTION, 1/4" X 12', STRAIGHT: Brand: MEDLINE

## (undated) DEVICE — STAPLER SKIN L440MM 32MM LNG 12 FIRING B FRM PWR + GRIPPING

## (undated) DEVICE — NEEDLE INSUF L150MM DIA2MM DISP FOR PNEUMOPERI ENDOPATH

## (undated) DEVICE — DISPOSABLE SUCTION/IRRIGATOR TUBE SET WITH TIP: Brand: AHTO

## (undated) DEVICE — TROCAR RMFG ENDOPATH 12X100M --

## (undated) DEVICE — SEALANT TISS 10 CC FOR HUM FIBRIN VISTASEAL

## (undated) DEVICE — TROCAR RMFG CANN S-SLV 5X100MM --

## (undated) DEVICE — SYRINGE,TOOMEY,IRRIGATION,70CC,STERILE: Brand: MEDLINE

## (undated) DEVICE — VISUALIZATION SYSTEM: Brand: CLEARIFY

## (undated) DEVICE — RESERVOIR,SUCTION,100CC,SILICONE: Brand: MEDLINE

## (undated) DEVICE — SUT MONOCRYL PLUS UD 4-0 --

## (undated) DEVICE — SUTURE PDS II SZ 0 L27IN ABSRB VLT L26MM CT-2 1/2 CIR Z334H

## (undated) DEVICE — VISIGI 3D®  CALIBRATION SYSTEM  SIZE 36FR STD W/ BULB: Brand: BOEHRINGER® VISIGI 3D™ SLEEVE GASTRECTOMY CALIBRATION SYSTEM, SIZE 36FR W/BULB

## (undated) DEVICE — SOL IRRIGATION INJ NACL 0.9% 500ML BTL

## (undated) DEVICE — APPLICATOR LAP 35 CM 2 RIGID VISTASEAL

## (undated) DEVICE — SUTURE ETHIB EXCL BR GRN TAPR PT 2-0 30 X563H X563H

## (undated) DEVICE — SPONGE DRAIN NONWOVEN 4X4IN -- 2/PK

## (undated) DEVICE — RELOAD STPL H4.1X2MM DIA60MM THCK TISS GRN 6 ROW PWR GST B

## (undated) DEVICE — STRAP,POSITIONING,KNEE/BODY,FOAM,4X60": Brand: MEDLINE